# Patient Record
Sex: FEMALE | Race: WHITE | NOT HISPANIC OR LATINO | Employment: FULL TIME | URBAN - METROPOLITAN AREA
[De-identification: names, ages, dates, MRNs, and addresses within clinical notes are randomized per-mention and may not be internally consistent; named-entity substitution may affect disease eponyms.]

---

## 2017-09-21 ENCOUNTER — TRANSFERRED RECORDS (OUTPATIENT)
Dept: HEALTH INFORMATION MANAGEMENT | Facility: CLINIC | Age: 22
End: 2017-09-21

## 2017-09-21 LAB — PAP-ABSTRACT: NORMAL

## 2019-06-06 ENCOUNTER — OFFICE VISIT (OUTPATIENT)
Dept: OBGYN | Facility: CLINIC | Age: 24
End: 2019-06-06
Payer: COMMERCIAL

## 2019-06-06 VITALS
OXYGEN SATURATION: 98 % | TEMPERATURE: 98.4 F | HEART RATE: 108 BPM | BODY MASS INDEX: 31.08 KG/M2 | HEIGHT: 63 IN | DIASTOLIC BLOOD PRESSURE: 85 MMHG | SYSTOLIC BLOOD PRESSURE: 128 MMHG | WEIGHT: 175.4 LBS

## 2019-06-06 DIAGNOSIS — Z11.3 SCREEN FOR STD (SEXUALLY TRANSMITTED DISEASE): Primary | ICD-10-CM

## 2019-06-06 DIAGNOSIS — N76.0 BV (BACTERIAL VAGINOSIS): ICD-10-CM

## 2019-06-06 DIAGNOSIS — B96.89 BV (BACTERIAL VAGINOSIS): ICD-10-CM

## 2019-06-06 DIAGNOSIS — R30.0 DYSURIA: ICD-10-CM

## 2019-06-06 LAB
ALBUMIN UR-MCNC: ABNORMAL MG/DL
APPEARANCE UR: CLEAR
BILIRUB UR QL STRIP: NEGATIVE
COLOR UR AUTO: YELLOW
GLUCOSE UR STRIP-MCNC: NEGATIVE MG/DL
HGB UR QL STRIP: ABNORMAL
KETONES UR STRIP-MCNC: NEGATIVE MG/DL
LEUKOCYTE ESTERASE UR QL STRIP: NEGATIVE
NITRATE UR QL: NEGATIVE
NON-SQ EPI CELLS #/AREA URNS LPF: ABNORMAL /LPF
PH UR STRIP: 7 PH (ref 5–7)
RBC #/AREA URNS AUTO: ABNORMAL /HPF
SOURCE: ABNORMAL
SP GR UR STRIP: 1.01 (ref 1–1.03)
SPECIMEN SOURCE: ABNORMAL
UROBILINOGEN UR STRIP-ACNC: 0.2 EU/DL (ref 0.2–1)
WBC #/AREA URNS AUTO: ABNORMAL /HPF
WET PREP SPEC: ABNORMAL

## 2019-06-06 PROCEDURE — 81001 URINALYSIS AUTO W/SCOPE: CPT | Performed by: NURSE PRACTITIONER

## 2019-06-06 PROCEDURE — 87086 URINE CULTURE/COLONY COUNT: CPT | Performed by: NURSE PRACTITIONER

## 2019-06-06 PROCEDURE — 87210 SMEAR WET MOUNT SALINE/INK: CPT | Performed by: NURSE PRACTITIONER

## 2019-06-06 PROCEDURE — 87591 N.GONORRHOEAE DNA AMP PROB: CPT | Performed by: NURSE PRACTITIONER

## 2019-06-06 PROCEDURE — 99203 OFFICE O/P NEW LOW 30 MIN: CPT | Performed by: NURSE PRACTITIONER

## 2019-06-06 PROCEDURE — 87491 CHLMYD TRACH DNA AMP PROBE: CPT | Performed by: NURSE PRACTITIONER

## 2019-06-06 RX ORDER — METRONIDAZOLE 500 MG/1
500 TABLET ORAL 2 TIMES DAILY
Qty: 14 TABLET | Refills: 0 | Status: SHIPPED | OUTPATIENT
Start: 2019-06-06 | End: 2019-06-11

## 2019-06-06 ASSESSMENT — MIFFLIN-ST. JEOR: SCORE: 1514.74

## 2019-06-06 ASSESSMENT — PAIN SCALES - GENERAL: PAINLEVEL: MILD PAIN (3)

## 2019-06-06 NOTE — PROGRESS NOTES
"Subjective     Maite Gates is a 23 year old female who presents to clinic today for the following health issues:    HPI   URINARY TRACT SYMPTOMS  Onset: 2 days    Description:   Painful urination (Dysuria): YES  Blood in urine (Hematuria): no   Delay in urine (Hesitency): no     Intensity: moderate    Progression of Symptoms:  same    Accompanying Signs & Symptoms:  Fever/chills: no   Flank pain no   Nausea and vomiting: no   Any vaginal symptoms: vaginal odor and vaginal itching  Abdominal/Pelvic Pain: no     History:   History of frequent UTI's: no   History of kidney stones: no   Sexually Active: YES  Possibility of pregnancy: No    Precipitating factors:   None    Therapies Tried and outcome: Increase fluid intake    Would also like STI screening. No known exposure or specific concern. In addition to dysuria, experiencing urinary frequency, urgency, small amount with feeling of incomplete emptying and occasional cloudy urine. Denies low back pain, pelvic pain, fever, nausea.    There is no problem list on file for this patient.    Past Surgical History:   Procedure Laterality Date      SECTION       NECK SURGERY       TONSILLECTOMY & ADENOIDECTOMY         Social History     Tobacco Use     Smoking status: Never Smoker     Smokeless tobacco: Never Used   Substance Use Topics     Alcohol use: Yes     Family History   Problem Relation Age of Onset     Parkinsonism Paternal Grandfather      Alzheimer Disease Paternal Grandfather          Reviewed and updated as needed this visit by Provider         Review of Systems   ROS COMP: Constitutional, HEENT, cardiovascular, pulmonary, gi and gu systems are negative, except as otherwise noted.      Objective    /85 (BP Location: Right arm, Patient Position: Sitting, Cuff Size: Adult Regular)   Pulse 108   Temp 98.4  F (36.9  C) (Oral)   Ht 1.6 m (5' 3\")   Wt 79.6 kg (175 lb 6.4 oz)   LMP 05/15/2019 (Approximate)   SpO2 98%   BMI 31.07 kg/m    Body " mass index is 31.07 kg/m .  Physical Exam   GENERAL: healthy, alert and no distress  RESP: lungs clear to auscultation - no rales, rhonchi or wheezes  CV: regular rate and rhythm, normal S1 S2, no S3 or S4, no murmur, click or rub, no peripheral edema and peripheral pulses strong  ABDOMEN: soft, nontender, no hepatosplenomegaly, no masses and bowel sounds normal   (female): normal female external genitalia, normal urethral meatus , vaginal mucosa pink, moist, well rugated, vaginal discharge - yellow and thick and normal cervix, adnexae, and uterus without masses.  MS: no gross musculoskeletal defects noted, no edema  SKIN: no suspicious lesions or rashes  PSYCH: mentation appears normal, affect normal/bright    Diagnostic Test Results:  Labs reviewed in Epic  Results for orders placed or performed in visit on 06/06/19 (from the past 24 hour(s))   UA with Microscopic   Result Value Ref Range    Color Urine Yellow     Appearance Urine Clear     Glucose Urine Negative NEG^Negative mg/dL    Bilirubin Urine Negative NEG^Negative    Ketones Urine Negative NEG^Negative mg/dL    Specific Gravity Urine 1.015 1.003 - 1.035    pH Urine 7.0 5.0 - 7.0 pH    Protein Albumin Urine Trace (A) NEG^Negative mg/dL    Urobilinogen Urine 0.2 0.2 - 1.0 EU/dL    Nitrite Urine Negative NEG^Negative    Blood Urine Trace (A) NEG^Negative    Leukocyte Esterase Urine Negative NEG^Negative    Source Midstream Urine     WBC Urine 0 - 5 OTO5^0 - 5 /HPF    RBC Urine 2-5 (A) OTO2^O - 2 /HPF    Squamous Epithelial /LPF Urine Few FEW^Few /LPF   Wet prep   Result Value Ref Range    Specimen Description Vagina     Wet Prep No Trichomonas seen     Wet Prep Many  Clue cells seen   (A)     Wet Prep No yeast seen     Wet Prep Rare  WBC'S seen              Assessment & Plan     1. Screen for STD (sexually transmitted disease)  - Chlamydia trachomatis PCR  - Neisseria gonorrhoeae PCR  - Wet prep    2. Dysuria  Await culture and treat if indicated.  - Urine  Culture Aerobic Bacterial  - UA with Microscopic  - Wet prep    3. BV (bacterial vaginosis)  Patient aware of result. Metronidazole 500mg po bid x 7 days.  #14 with no refills.  Cautioned patient not to drink alcohol while taking this medication.  Advised patient to take with food as it may cause GI upset. No intercourse x 1 week.  - metroNIDAZOLE (FLAGYL) 500 MG tablet; Take 1 tablet (500 mg) by mouth 2 times daily for 7 days  Dispense: 14 tablet; Refill: 0     BERLIN Parisi CNP  [facility]

## 2019-06-06 NOTE — Clinical Note
Please abstract the following data from this visit with this patient into the appropriate field in Epic:Pap smear done on this date: 9/21/17 (approximately), by this group: [facility], results were NIL.

## 2019-06-07 LAB
BACTERIA SPEC CULT: NORMAL
C TRACH DNA SPEC QL NAA+PROBE: NEGATIVE
N GONORRHOEA DNA SPEC QL NAA+PROBE: NEGATIVE
SPECIMEN SOURCE: NORMAL

## 2019-06-11 ENCOUNTER — OFFICE VISIT (OUTPATIENT)
Dept: FAMILY MEDICINE | Facility: CLINIC | Age: 24
End: 2019-06-11
Payer: COMMERCIAL

## 2019-06-11 VITALS
OXYGEN SATURATION: 97 % | BODY MASS INDEX: 29.77 KG/M2 | WEIGHT: 168 LBS | HEART RATE: 87 BPM | RESPIRATION RATE: 16 BRPM | SYSTOLIC BLOOD PRESSURE: 114 MMHG | HEIGHT: 63 IN | TEMPERATURE: 98.1 F | DIASTOLIC BLOOD PRESSURE: 78 MMHG

## 2019-06-11 DIAGNOSIS — K21.9 GASTROESOPHAGEAL REFLUX DISEASE, ESOPHAGITIS PRESENCE NOT SPECIFIED: Primary | ICD-10-CM

## 2019-06-11 PROCEDURE — 99214 OFFICE O/P EST MOD 30 MIN: CPT | Performed by: PHYSICIAN ASSISTANT

## 2019-06-11 RX ORDER — OMEPRAZOLE 20 MG/1
20 TABLET, DELAYED RELEASE ORAL DAILY
Qty: 90 TABLET | Refills: 1 | Status: SHIPPED | OUTPATIENT
Start: 2019-06-11

## 2019-06-11 RX ORDER — SUCRALFATE ORAL 1 G/10ML
1000 SUSPENSION ORAL
COMMUNITY
Start: 2019-06-10 | End: 2019-06-24

## 2019-06-11 ASSESSMENT — MIFFLIN-ST. JEOR: SCORE: 1481.17

## 2019-06-11 ASSESSMENT — PAIN SCALES - GENERAL: PAINLEVEL: MODERATE PAIN (4)

## 2019-06-11 NOTE — PROGRESS NOTES
"Subjective     Maite Gates is a 23 year old female who presents to clinic today for the following health issues:    HPI   Chest pain      Duration: 5 days    Description  Chest pain/discomfort     Severity: variable     Accompanying signs and symptoms: cold symptoms last week which resolved     More intense burning, \"firy\" chest. Eating makes it worse. No radiation of pain.     History (predisposing factors):  Was seen at er yesterday due to chest pain - was told she has acid reflux and to drink pepto. Was instructed to see gastro but she had this appointment already set up so she cam here     Precipitating or alleviating factors: hasnt eaten anything for the last 36 hours     Therapies tried and outcome:  Carafate, pepto - no relief  Care Everywhere Reviewed    Tx: ibu: 800mg q 6hr over the last 5 days.     There is no problem list on file for this patient.    Past Surgical History:   Procedure Laterality Date      SECTION       NECK SURGERY       TONSILLECTOMY & ADENOIDECTOMY         Social History     Tobacco Use     Smoking status: Never Smoker     Smokeless tobacco: Never Used   Substance Use Topics     Alcohol use: Yes     Family History   Problem Relation Age of Onset     Parkinsonism Paternal Grandfather      Alzheimer Disease Paternal Grandfather          Current Outpatient Medications   Medication Sig Dispense Refill     omeprazole (PRILOSEC OTC) 20 MG EC tablet Take 1 tablet (20 mg) by mouth daily 90 tablet 1     ranitidine (ZANTAC) 150 MG tablet Take 1 tablet (150 mg) by mouth 2 times daily 60 tablet 1     sucralfate (CARAFATE) 1 GM/10ML suspension Take 1,000 mg by mouth       No Known Allergies    Reviewed and updated as needed this visit by Provider  Tobacco  Allergies  Meds  Problems  Med Hx  Surg Hx  Fam Hx         Review of Systems   ROS COMP: Constitutional, HEENT, cardiovascular, pulmonary, gi and gu systems are negative, except as otherwise noted.      Objective    /78   " "Pulse 87   Temp 98.1  F (36.7  C) (Oral)   Resp 16   Ht 1.6 m (5' 3\")   Wt 76.2 kg (168 lb)   LMP 05/15/2019 (Approximate)   SpO2 97%   BMI 29.76 kg/m    Body mass index is 29.76 kg/m .  Physical Exam   GENERAL: healthy, alert and no distress  Head: Normocephalic, atraumatic.  Eyes: Conjunctiva clear, non icteric. PERRLA.  Ears: External ears and TMs normal BL.  Nose: Septum midline, nasal mucosa pink and moist. No discharge.  Mouth / Throat: Normal dentition.  No oral lesions. Pharynx non erythematous, tonsils without hypertrophy.  Neck: Supple, no enlarged LN, trachea midline.  Heart: S1 and S2 normal, no murmurs, clicks, gallops or rubs. Regular rate and rhythm.  Chest: Clear; no wheezes or rales.  The abdomen is soft without tenderness, guarding, mass, rebound or organomegaly. Bowel sounds are normal. Mild epigastric tenderness. No rib/ chest tenderness.         Diagnostic Test Results:  Labs reviewed in Epic        Assessment & Plan       ICD-10-CM    1. Gastroesophageal reflux disease, esophagitis presence not specified K21.9 omeprazole (PRILOSEC OTC) 20 MG EC tablet     ranitidine (ZANTAC) 150 MG tablet     GASTROENTEROLOGY ADULT REF PROCEDURE ONLY   start zantac and omeprazole. warning signs discussed. side effects discussed  Will get endoscopy  Follow up  Dependant on results.   Recheck 4 wks.   See Patient Instructions   Anthony Calvert PA-C  [facility]      "

## 2019-06-11 NOTE — PATIENT INSTRUCTIONS
Patient Education     What Is GERD?     With GERD, the weak LES allows food and fluids to travel back, or reflux, into the esophagus.      If you often have a painful burning feeling in your chest after you eat, you may have gastroesophageal reflux disease (GERD). Heartburn that keeps coming back is a classic symptom of GERD. But you may have other symptoms as well. A GERD diagnosis is made only after a complete evaluation by your healthcare provider.  Note: Chest pain may also be caused by heart problems. Be sure to have all chest pain evaluated by a healthcare provider.   When you have a reflux problem  After you eat, food travels from your mouth down the esophagus to your stomach. Along the way, food passes through a one-way valve called the lower esophageal sphincter (LES). The LES sits at the opening to your stomach. Normally the LES opens when you swallow. It lets food enter the stomach, then closes quickly. With GERD, the LES doesn t work normally. It lets food and stomach acid flow back (reflux) into the esophagus.  Some common symptoms    Frequent heartburn or burping    Sour-tasting fluid backing up into your mouth    Symptoms that get worse after you eat, bend over, or lie down    Trouble swallowing or pain when swallowing    A dry, long-term (chronic) cough    Upset stomach (nausea) or vomiting  Relieving your discomfort  You and your healthcare provider can work together to find the treatment options that best ease your symptoms. These may include lifestyle changes, medicine, and possibly surgery.  Many people find their GERD symptoms decrease when they eat small frequent meals instead of 3 large ones. Reducing the amount of fatty foods in your diet will also help.   The following foods tend to cause problems for people diagnosed with GERD:    Tomatoes and tomato products    Alcohol    Coffee    Peppermint    Greasy or spicy foods  Talk with your provider if you don t understand how to make the  dietary changes needed to control your GERD symptoms. Your provider can refer you to a nutritionist.  Date Last Reviewed: 7/1/2016 2000-2018 [employer]. [address] [place] [address], [place] [address]. All rights reserved. This information is not intended as a substitute for professional medical care. Always follow your healthcare professional's instructions.

## 2019-06-24 ENCOUNTER — NURSE TRIAGE (OUTPATIENT)
Dept: NURSING | Facility: CLINIC | Age: 24
End: 2019-06-24

## 2019-06-24 ENCOUNTER — OFFICE VISIT (OUTPATIENT)
Dept: OBGYN | Facility: CLINIC | Age: 24
End: 2019-06-24
Payer: COMMERCIAL

## 2019-06-24 VITALS
HEART RATE: 90 BPM | OXYGEN SATURATION: 99 % | TEMPERATURE: 98.3 F | WEIGHT: 170.4 LBS | SYSTOLIC BLOOD PRESSURE: 123 MMHG | HEIGHT: 63 IN | DIASTOLIC BLOOD PRESSURE: 79 MMHG | BODY MASS INDEX: 30.19 KG/M2

## 2019-06-24 DIAGNOSIS — O20.0 THREATENED ABORTION: Primary | ICD-10-CM

## 2019-06-24 LAB — B-HCG SERPL-ACNC: 14 IU/L (ref 0–5)

## 2019-06-24 PROCEDURE — 99213 OFFICE O/P EST LOW 20 MIN: CPT | Performed by: NURSE PRACTITIONER

## 2019-06-24 PROCEDURE — 86900 BLOOD TYPING SEROLOGIC ABO: CPT | Performed by: NURSE PRACTITIONER

## 2019-06-24 PROCEDURE — 36415 COLL VENOUS BLD VENIPUNCTURE: CPT | Performed by: NURSE PRACTITIONER

## 2019-06-24 PROCEDURE — 84702 CHORIONIC GONADOTROPIN TEST: CPT | Performed by: NURSE PRACTITIONER

## 2019-06-24 PROCEDURE — 86901 BLOOD TYPING SEROLOGIC RH(D): CPT | Performed by: NURSE PRACTITIONER

## 2019-06-24 PROCEDURE — 86850 RBC ANTIBODY SCREEN: CPT | Performed by: NURSE PRACTITIONER

## 2019-06-24 ASSESSMENT — PAIN SCALES - GENERAL: PAINLEVEL: NO PAIN (0)

## 2019-06-24 ASSESSMENT — MIFFLIN-ST. JEOR: SCORE: 1492.06

## 2019-06-24 NOTE — PROGRESS NOTES
"Subjective     Maite Gates is a 23 year old female who presents to clinic today for the following health issues:    HPI   Possible miscarriage. Per patient, last 3 cycles have been a little shorter than normal-each about 2-3 days of bleeding. They came at the expected time, normal flow. Had that on 19. Then last week started to have brown spotting that became more red and normal flow. No cramping, no clots, no passage of tissue. No heavy bleeding. Denies dizziness. Bleeding today is minimal spotting. Took a pregnancy test as she read online that brown could be miscarriage. Per patient, test was positive almost immediately. Denies other pregnancy symptoms.  If she is pregnant and it progresses, would plan to parent-this was not planned.  If she is not pregnant or miscarries, would like to discuss possible oral contraceptive pill prescription or IUD.  Believes she is O positive blood type.    There is no problem list on file for this patient.    Past Surgical History:   Procedure Laterality Date      SECTION       NECK SURGERY       TONSILLECTOMY & ADENOIDECTOMY         Social History     Tobacco Use     Smoking status: Never Smoker     Smokeless tobacco: Never Used   Substance Use Topics     Alcohol use: Yes     Family History   Problem Relation Age of Onset     Parkinsonism Paternal Grandfather      Alzheimer Disease Paternal Grandfather          Reviewed and updated as needed this visit by Provider  Tobacco  Allergies  Meds  Problems  Med Hx  Surg Hx  Fam Hx  Soc Hx          Review of Systems   ROS COMP: Constitutional, HEENT, cardiovascular, pulmonary, gi and gu systems are negative, except as otherwise noted.      Objective    /79 (BP Location: Right arm, Patient Position: Sitting, Cuff Size: Adult Regular)   Pulse 90   Temp 98.3  F (36.8  C) (Oral)   Ht 1.6 m (5' 3\")   Wt 77.3 kg (170 lb 6.4 oz)   LMP 2019 (Exact Date)   SpO2 99%   BMI 30.19 kg/m    Body mass index is " 30.19 kg/m .  Physical Exam   GENERAL: healthy, alert and no distress  ABDOMEN: soft, nontender, no hepatosplenomegaly, no masses and bowel sounds normal  MS: no gross musculoskeletal defects noted, no edema  SKIN: no suspicious lesions or rashes  PSYCH: mentation appears normal, affect normal/bright    Assessment & Plan     1. Threatened   We discussed her cycle changes recently and positive home test and possible etiologies of her bleeding now. Check Quant HCG and if positive, plan repeat Wednesday. If continues to rise, will recheck Friday and plan ultrasound when high enough.  If negative, will notify patient and we discussed contraception briefly, if she chooses oral contraceptive pills, can send prescription for her. If she chooses IUD, discussed timeframe for insertion. Patient to plan pelvic rest at this time. Warning signs to monitor for and report immediately and also when to proceed directly to ED discussed with patient and she verbalizes understanding.  - ABO/Rh type and screen  - HCG quantitative pregnancy; Standing  - HCG quantitative pregnancy     BERLIN Parisi CNP  [facility]

## 2019-06-24 NOTE — TELEPHONE ENCOUNTER
Maiet had a positive pregnancy test yesterday. She is having vaginal bleeding that is like a period without cramps. She's not sure how far along she is as her last three periods have not been regular, nor as heavy as or lasting as long as what she usually has. Per the protocol I  instructed she be seen today or tomorrow. I told her when to call back.  She stated understanding and agreement.  I transferred her to scheduling.  Additional Information    Negative: Shock suspected (e.g., cold/pale/clammy skin, too weak to stand, low BP, rapid pulse)    Negative: Difficult to awaken or acting confused (e.g., disoriented, slurred speech)    Negative: Passed out (i.e., fainted, collapsed and was not responding)    Negative: Sounds like a life-threatening emergency to the triager    Negative: SEVERE abdominal pain (e.g., excruciating)    Negative: SEVERE vaginal bleeding (i.e., soaking 2 pads / hour, large blood clots) and present for 2 or more hours    Negative: SEVERE dizziness (e.g., unable to stand, requires support to walk, feels like passing out)    Negative: Passed tissue (e.g., gray-white)    Negative: Shoulder pain    Negative: Constant abdominal pain lasting > 1 hour    Negative: Fever > 100.4 F (38.0 C)    Negative: Pale skin (pallor) of new onset or worsening    Negative: Patient sounds very sick or weak to the triager    Negative: MODERATE vaginal bleeding (i.e., soaking 1 pad / hour; clots)    Negative: Intermittent lower abdominal pain (e.g., cramping) lasting > 24 hours    Negative: Pain or burning with passing urine (urination)    Negative: Prior history of 'ectopic pregnancy' or previous tubal surgery (e.g., tubal ligation)    Negative: Patient wants to be seen    MILD vaginal bleeding (i.e., less than 1 pad / hour; less than patient's usual menstrual bleeding; not just spotting)    Protocols used: PREGNANCY - VAGINAL BLEEDING LESS THAN 20 WEEKS PRIETOA-KEVIN ANTOINE RN [facility]

## 2019-06-25 LAB
ABO + RH BLD: NORMAL
ABO + RH BLD: NORMAL
BLD GP AB SCN SERPL QL: NORMAL
BLOOD BANK CMNT PATIENT-IMP: NORMAL
SPECIMEN EXP DATE BLD: NORMAL

## 2019-06-27 DIAGNOSIS — O20.0 THREATENED ABORTION: ICD-10-CM

## 2019-06-27 LAB — B-HCG SERPL-ACNC: 4 IU/L (ref 0–5)

## 2019-06-27 PROCEDURE — 36415 COLL VENOUS BLD VENIPUNCTURE: CPT | Performed by: NURSE PRACTITIONER

## 2019-06-27 PROCEDURE — 84702 CHORIONIC GONADOTROPIN TEST: CPT | Performed by: NURSE PRACTITIONER

## 2021-10-06 LAB
HEPATITIS B SURFACE ANTIGEN (EXTERNAL): NONREACTIVE
HIV1+2 AB SERPL QL IA: NONREACTIVE

## 2021-10-08 LAB — RUBELLA ANTIBODY IGG (EXTERNAL): NORMAL

## 2022-04-19 ENCOUNTER — HOSPITAL ENCOUNTER (OUTPATIENT)
Facility: CLINIC | Age: 27
Discharge: HOME | End: 2022-04-20
Attending: OBSTETRICS & GYNECOLOGY | Admitting: OBSTETRICS & GYNECOLOGY
Payer: COMMERCIAL

## 2022-04-19 DIAGNOSIS — O99.019 ANEMIA AFFECTING THIRD PREGNANCY: ICD-10-CM

## 2022-04-19 PROBLEM — O36.8390 NON-REASSURING ELECTRONIC FETAL MONITORING TRACING: Status: ACTIVE | Noted: 2022-04-19

## 2022-04-19 LAB
ABO/RH(D): NORMAL
ALBUMIN SERPL-MCNC: 2.8 G/DL (ref 3.4–5)
ALP SERPL-CCNC: 176 U/L (ref 40–150)
ALT SERPL W P-5'-P-CCNC: 16 U/L (ref 0–50)
ANION GAP SERPL CALCULATED.3IONS-SCNC: 11 MMOL/L (ref 3–14)
ANTIBODY SCREEN: NEGATIVE
AST SERPL W P-5'-P-CCNC: 16 U/L (ref 0–45)
BASOPHILS # BLD AUTO: 0.1 10E3/UL (ref 0–0.2)
BASOPHILS NFR BLD AUTO: 1 %
BILIRUB SERPL-MCNC: 0.4 MG/DL (ref 0.2–1.3)
BUN SERPL-MCNC: 7 MG/DL (ref 7–30)
CALCIUM SERPL-MCNC: 9.8 MG/DL (ref 8.5–10.1)
CHLORIDE BLD-SCNC: 106 MMOL/L (ref 94–109)
CO2 SERPL-SCNC: 21 MMOL/L (ref 20–32)
CREAT SERPL-MCNC: 0.7 MG/DL (ref 0.52–1.04)
EOSINOPHIL # BLD AUTO: 0.1 10E3/UL (ref 0–0.7)
EOSINOPHIL NFR BLD AUTO: 1 %
ERYTHROCYTE [DISTWIDTH] IN BLOOD BY AUTOMATED COUNT: 14.4 % (ref 10–15)
GFR SERPL CREATININE-BSD FRML MDRD: >90 ML/MIN/1.73M2
GLUCOSE BLD-MCNC: 118 MG/DL (ref 70–99)
HCT VFR BLD AUTO: 32.2 % (ref 35–47)
HGB BLD-MCNC: 10.7 G/DL (ref 11.7–15.7)
IMM GRANULOCYTES # BLD: 0.2 10E3/UL
IMM GRANULOCYTES NFR BLD: 1 %
LYMPHOCYTES # BLD AUTO: 2 10E3/UL (ref 0.8–5.3)
LYMPHOCYTES NFR BLD AUTO: 16 %
MCH RBC QN AUTO: 27.7 PG (ref 26.5–33)
MCHC RBC AUTO-ENTMCNC: 33.2 G/DL (ref 31.5–36.5)
MCV RBC AUTO: 83 FL (ref 78–100)
MONOCYTES # BLD AUTO: 0.6 10E3/UL (ref 0–1.3)
MONOCYTES NFR BLD AUTO: 5 %
NEUTROPHILS # BLD AUTO: 9.9 10E3/UL (ref 1.6–8.3)
NEUTROPHILS NFR BLD AUTO: 76 %
NRBC # BLD AUTO: 0 10E3/UL
NRBC BLD AUTO-RTO: 0 /100
PLATELET # BLD AUTO: 196 10E3/UL (ref 150–450)
POTASSIUM BLD-SCNC: 3.4 MMOL/L (ref 3.4–5.3)
PROT SERPL-MCNC: 7.1 G/DL (ref 6.8–8.8)
RBC # BLD AUTO: 3.86 10E6/UL (ref 3.8–5.2)
SARS-COV-2 RNA RESP QL NAA+PROBE: NEGATIVE
SODIUM SERPL-SCNC: 138 MMOL/L (ref 133–144)
SPECIMEN EXPIRATION DATE: NORMAL
TSH SERPL DL<=0.005 MIU/L-ACNC: 1.81 MU/L (ref 0.4–4)
WBC # BLD AUTO: 12.7 10E3/UL (ref 4–11)

## 2022-04-19 PROCEDURE — 99222 1ST HOSP IP/OBS MODERATE 55: CPT | Mod: AI | Performed by: OBSTETRICS & GYNECOLOGY

## 2022-04-19 PROCEDURE — U0003 INFECTIOUS AGENT DETECTION BY NUCLEIC ACID (DNA OR RNA); SEVERE ACUTE RESPIRATORY SYNDROME CORONAVIRUS 2 (SARS-COV-2) (CORONAVIRUS DISEASE [COVID-19]), AMPLIFIED PROBE TECHNIQUE, MAKING USE OF HIGH THROUGHPUT TECHNOLOGIES AS DESCRIBED BY CMS-2020-01-R: HCPCS | Performed by: STUDENT IN AN ORGANIZED HEALTH CARE EDUCATION/TRAINING PROGRAM

## 2022-04-19 PROCEDURE — G0463 HOSPITAL OUTPT CLINIC VISIT: HCPCS

## 2022-04-19 PROCEDURE — 96360 HYDRATION IV INFUSION INIT: CPT

## 2022-04-19 PROCEDURE — 84443 ASSAY THYROID STIM HORMONE: CPT | Performed by: STUDENT IN AN ORGANIZED HEALTH CARE EDUCATION/TRAINING PROGRAM

## 2022-04-19 PROCEDURE — 80053 COMPREHEN METABOLIC PANEL: CPT | Performed by: STUDENT IN AN ORGANIZED HEALTH CARE EDUCATION/TRAINING PROGRAM

## 2022-04-19 PROCEDURE — 85025 COMPLETE CBC W/AUTO DIFF WBC: CPT | Performed by: STUDENT IN AN ORGANIZED HEALTH CARE EDUCATION/TRAINING PROGRAM

## 2022-04-19 PROCEDURE — 96361 HYDRATE IV INFUSION ADD-ON: CPT

## 2022-04-19 PROCEDURE — 82728 ASSAY OF FERRITIN: CPT | Performed by: OBSTETRICS & GYNECOLOGY

## 2022-04-19 PROCEDURE — 36415 COLL VENOUS BLD VENIPUNCTURE: CPT | Performed by: STUDENT IN AN ORGANIZED HEALTH CARE EDUCATION/TRAINING PROGRAM

## 2022-04-19 PROCEDURE — 120N000002 HC R&B MED SURG/OB UMMC

## 2022-04-19 PROCEDURE — 86901 BLOOD TYPING SEROLOGIC RH(D): CPT | Performed by: STUDENT IN AN ORGANIZED HEALTH CARE EDUCATION/TRAINING PROGRAM

## 2022-04-19 PROCEDURE — 258N000003 HC RX IP 258 OP 636

## 2022-04-19 RX ORDER — OMEPRAZOLE 20 MG/1
20 TABLET, DELAYED RELEASE ORAL DAILY
Status: DISCONTINUED | OUTPATIENT
Start: 2022-04-19 | End: 2022-04-19 | Stop reason: CLARIF

## 2022-04-19 RX ORDER — PANTOPRAZOLE SODIUM 20 MG/1
40 TABLET, DELAYED RELEASE ORAL DAILY PRN
Status: DISCONTINUED | OUTPATIENT
Start: 2022-04-19 | End: 2022-04-20 | Stop reason: HOSPADM

## 2022-04-19 RX ORDER — VITAMIN A ACETATE, .BETA.-CAROTENE, ASCORBIC ACID, CHOLECALCIFEROL, .ALPHA.-TOCOPHEROL ACETATE, DL-, THIAMINE MONONITRATE, RIBOFLAVIN, NIACINAMIDE, PYRIDOXINE HYDROCHLORIDE, FOLIC ACID, CYANOCOBALAMIN, CALCIUM CARBONATE, FERROUS FUMARATE, ZINC OXIDE, AND CUPRIC OXIDE 2000; 2000; 120; 400; 22; 1.84; 3; 20; 10; 1; 12; 200; 27; 25; 2 [IU]/1; [IU]/1; MG/1; [IU]/1; MG/1; MG/1; MG/1; MG/1; MG/1; MG/1; UG/1; MG/1; MG/1; MG/1; MG/1
1 TABLET ORAL DAILY
COMMUNITY

## 2022-04-19 RX ORDER — METOCLOPRAMIDE 10 MG/1
10 TABLET ORAL EVERY 6 HOURS PRN
Status: DISCONTINUED | OUTPATIENT
Start: 2022-04-19 | End: 2022-04-20 | Stop reason: HOSPADM

## 2022-04-19 RX ORDER — FAMOTIDINE 20 MG/1
20 TABLET, FILM COATED ORAL DAILY PRN
Status: DISCONTINUED | OUTPATIENT
Start: 2022-04-19 | End: 2022-04-20 | Stop reason: HOSPADM

## 2022-04-19 RX ORDER — AMOXICILLIN 250 MG
1 CAPSULE ORAL 2 TIMES DAILY
Status: DISCONTINUED | OUTPATIENT
Start: 2022-04-19 | End: 2022-04-20 | Stop reason: HOSPADM

## 2022-04-19 RX ORDER — SODIUM CHLORIDE, SODIUM LACTATE, POTASSIUM CHLORIDE, CALCIUM CHLORIDE 600; 310; 30; 20 MG/100ML; MG/100ML; MG/100ML; MG/100ML
INJECTION, SOLUTION INTRAVENOUS CONTINUOUS
Status: DISCONTINUED | OUTPATIENT
Start: 2022-04-19 | End: 2022-04-20 | Stop reason: HOSPADM

## 2022-04-19 RX ORDER — SODIUM CHLORIDE, SODIUM LACTATE, POTASSIUM CHLORIDE, CALCIUM CHLORIDE 600; 310; 30; 20 MG/100ML; MG/100ML; MG/100ML; MG/100ML
INJECTION, SOLUTION INTRAVENOUS
Status: COMPLETED
Start: 2022-04-19 | End: 2022-04-19

## 2022-04-19 RX ORDER — ONDANSETRON 4 MG/1
4 TABLET, ORALLY DISINTEGRATING ORAL EVERY 6 HOURS PRN
Status: DISCONTINUED | OUTPATIENT
Start: 2022-04-19 | End: 2022-04-20 | Stop reason: HOSPADM

## 2022-04-19 RX ORDER — PRENATAL VIT/IRON FUM/FOLIC AC 27MG-0.8MG
1 TABLET ORAL DAILY
Status: DISCONTINUED | OUTPATIENT
Start: 2022-04-20 | End: 2022-04-20 | Stop reason: HOSPADM

## 2022-04-19 RX ORDER — ACETAMINOPHEN 325 MG/1
650 TABLET ORAL EVERY 4 HOURS PRN
Status: DISCONTINUED | OUTPATIENT
Start: 2022-04-19 | End: 2022-04-20 | Stop reason: HOSPADM

## 2022-04-19 RX ORDER — PROCHLORPERAZINE MALEATE 10 MG
10 TABLET ORAL EVERY 6 HOURS PRN
Status: DISCONTINUED | OUTPATIENT
Start: 2022-04-19 | End: 2022-04-20 | Stop reason: HOSPADM

## 2022-04-19 RX ORDER — ONDANSETRON 2 MG/ML
4 INJECTION INTRAMUSCULAR; INTRAVENOUS EVERY 6 HOURS PRN
Status: DISCONTINUED | OUTPATIENT
Start: 2022-04-19 | End: 2022-04-20 | Stop reason: HOSPADM

## 2022-04-19 RX ORDER — METOCLOPRAMIDE HYDROCHLORIDE 5 MG/ML
10 INJECTION INTRAMUSCULAR; INTRAVENOUS EVERY 6 HOURS PRN
Status: DISCONTINUED | OUTPATIENT
Start: 2022-04-19 | End: 2022-04-20 | Stop reason: HOSPADM

## 2022-04-19 RX ORDER — PROCHLORPERAZINE 25 MG
25 SUPPOSITORY, RECTAL RECTAL EVERY 12 HOURS PRN
Status: DISCONTINUED | OUTPATIENT
Start: 2022-04-19 | End: 2022-04-20 | Stop reason: HOSPADM

## 2022-04-19 RX ORDER — AMOXICILLIN 250 MG
2 CAPSULE ORAL 2 TIMES DAILY
Status: DISCONTINUED | OUTPATIENT
Start: 2022-04-19 | End: 2022-04-20 | Stop reason: HOSPADM

## 2022-04-19 RX ADMIN — SODIUM CHLORIDE, POTASSIUM CHLORIDE, SODIUM LACTATE AND CALCIUM CHLORIDE: 600; 310; 30; 20 INJECTION, SOLUTION INTRAVENOUS at 17:35

## 2022-04-19 NOTE — PLAN OF CARE
Data: Patient presented to [facility] at 1420.   Reason for maternal/fetal assessment per patient is extended fetal monitoring.  Patient is a . Prenatal record reviewed.      OB History    Para Term  AB Living   2 1 1 0 0 1   SAB IAB Ectopic Multiple Live Births   0 0 0 0 1      # Outcome Date GA Lbr Aman/2nd Weight Sex Delivery Anes PTL Lv   2 Current            1 Term  41w0d  4.309 kg (9 lb 8 oz) M CS-Unspec   VERONICA   . Medical history:   Past Medical History:   Diagnosis Date     NO ACTIVE PROBLEMS    . Gestational Age 36w3d. VSS. Fetal movement present. Patient denies cramping, backache, vaginal discharge, pelvic pressure, UTI symptoms, GI problems, bloody show, vaginal bleeding, edema, headache, visual disturbances, epigastric or URQ pain, abdominal pain, rupture of membranes. Support persons are present.  Action: Verbal consent for EFM. Triage assessment completed. EFM and [place] applied for fetal uterine assessment . Fetal assessment: Presumed adequate fetal oxygenation documented (see flow record).   Response: Dr. Peñaloza informed of arrival. Plan per provider is admit to inpatient for extended monitoring. Patient verbalized agreement with plan. Patient transferred to room 479 ambulatory, oriented to room and call light. Labs drawn and IV started.

## 2022-04-19 NOTE — H&P
Maternal Fetal Medicine   History and Physical    Patient's last menstrual period was 2021.  Estimated Date of Delivery: May 14, 2022   Gestational age:  36w3d   Obstetric History:              History of Present Illness      Maite Gates is a 26 year old  at 36w3d by LMP who presents today from [facility] for evaluation and monitoring of fetal SVT.     Patient reports that they were told this arrhythmia was seen on doppler 4 weeks ago. Ultrasound the following day did not show anything concerning, and patient was instructed to limit caffeine. The arrhythmia was visualized again today on US which prompted her admission.     Pregnancy has been uncomplicated, other than mild iron deficiency anemia. She has not had any elevated blood pressures, GTT passed. She has no chronic medical conditions. She has previously taken sertraline for anxiety but is not currently taking.    Today, she has a headache from not eating or drinking. Otherwise, she is feeling well.     Denies fevers, chills,recent illness or sick contacts, vision changes/spots, chest pain, shortness of breath, upper abdominal pain, nausea, vomiting, diarrhea, vaginal bleeding, leaking fluid from vagina. Active fetus.      Her pregnancy is complicated by:  1. Fetal arrhythmia    OB history notable for:   Prior c/s at 41w1d. She reports she was dilated to 5 cm, a stat was called due to failure to progress, general anesthesia required, and code blue called for infant per operative report. It was a traumatic birth experience.   OB History    Para Term  AB Living   2 1 1 0 0 1   SAB IAB Ectopic Multiple Live Births   0 0 0 0 1      # Outcome Date GA Lbr Aman/2nd Weight Sex Delivery Anes PTL Lv   2 Current            1 Term  41w0d  4.309 kg (9 lb 8 oz) M CS-Unspec   VERONICA       Prenatal Labs  Recent Labs   Lab Test 19  1418   ABO O   RH Pos   AS Neg    Rhogam not indicated     Lab Results   Component Value Date     ABO O 2019    RH Pos 2019    AS Neg 2019    CHPCRT Negative 2019    GCPCRT Negative 2019    HIV negative  G/C negative  Rubella immune    GBS Status:   No results found for: GBS    Normal pap .     Genetic screening: not done          Past Medical History     Past Medical History:   Diagnosis Date     NO ACTIVE PROBLEMS           Past Surgical History     Past Surgical History:   Procedure Laterality Date      SECTION       NECK SURGERY       TONSILLECTOMY & ADENOIDECTOMY            Medications     Prior to Admission Medications   Prescriptions Last Dose Informant Patient Reported? Taking?   Prenatal Vit-Fe Fumarate-FA (PNV PRENATAL PLUS MULTIVITAMIN) 27-1 MG TABS per tablet 2022 at 0800  Yes Yes   Sig: Take 1 tablet by mouth daily   omeprazole (PRILOSEC OTC) 20 MG EC tablet Unknown at Unknown time  No Yes   Sig: Take 1 tablet (20 mg) by mouth daily   ranitidine (ZANTAC) 150 MG tablet Unknown at Unknown time  No Yes   Sig: Take 1 tablet (150 mg) by mouth 2 times daily      Facility-Administered Medications: None     No Known Allergies         Social History     Social History     Tobacco Use     Smoking status: Never Smoker     Smokeless tobacco: Never Used   Denies alcohol or non-prescription drug use in pregnancy         Review of Systems    Non-contributory beyond HPI          Physical Exam     Vitals:    22 1444   BP: 125/79     Gen: Pleasant, gravid woman.  Sitting up in bed, in NAD.  Heart:: regular rate and rhythm and regular rate and rhythm without murmur, gallop or rub    Lungs: CTA B/L, no wheezing or crackles.  Abdomen: Non-tender, Gravid  Back: no lesions, no CVA tenderness  Extremities: trace edema LE bilaterally, NT        Contraction frequency: 0 in 10 minutes  FHR:  Rate 130's, moderate variability, + accelerations, no decelerations. Normal sinus rhythm           Category: 1         Laboratory/Imaging     Component      Latest Ref Rng & Units  2022   WBC      4.0 - 11.0 10e3/uL 12.7 (H)   RBC Count      3.80 - 5.20 10e6/uL 3.86   Hemoglobin      11.7 - 15.7 g/dL 10.7 (L)   Hematocrit      35.0 - 47.0 % 32.2 (L)   MCV      78 - 100 fL 83   MCH      26.5 - 33.0 pg 27.7   MCHC      31.5 - 36.5 g/dL 33.2   RDW      10.0 - 15.0 % 14.4   Platelet Count      150 - 450 10e3/uL 196   % Neutrophils      % 76   % Lymphocytes      % 16   % Monocytes      % 5   % Eosinophils      % 1   % Basophils      % 1   % Immature Granulocytes      % 1   NRBCs per 100 WBC      <1 /100 0   Absolute Neutrophils      1.6 - 8.3 10e3/uL 9.9 (H)   Absolute Lymphocytes      0.8 - 5.3 10e3/uL 2.0   Absolute Monocytes      0.0 - 1.3 10e3/uL 0.6   Absolute Eosinophils      0.0 - 0.7 10e3/uL 0.1   Absolute Basophils      0.0 - 0.2 10e3/uL 0.1   Absolute Immature Granulocytes      <=0.4 10e3/uL 0.2   Absolute NRBCs      10e3/uL 0.0   Sodium      133 - 144 mmol/L 138   Potassium      3.4 - 5.3 mmol/L 3.4   Chloride      94 - 109 mmol/L 106     US 22  EFW 3,585 g    96%        Hadlock    EFW (lb) 7 lb  EFW (oz) 14 oz    Other Structures   bpm    Impression  =========  1) Herbert intrauterine pregnancy at 36w 3d gestational age.  2) None of the anomalies commonly detected by ultrasound were evident in the limited fetal anatomic survey as described above.  3) Growth parameters and estimated fetal weight were consistent with established dates.  4) The amniotic fluid volume appeared normal.  5) Normal fetal activity for gestational age.  6) A fetal arrhythmia is noted with frequent premature atrial contractions which occasionally cause runs of supraventricular tachycardia.  7) No sonographic evidence of hydrops.           Assessment and Plan   Maite Gates is a 26 year old  at 36w3d by LMP, who presents with fetal intermittent supraventricular tachycardia. She is admitted for extended monitoring to determine persistence of arrhythmia and need for cardioversion. Fetal  heart tracing since admission reactive and reassuring with normal sinus rhythm. No hydrops on prior ultrasound.     Fetal Intermittent SVT   -Continuous monitoring for frequency of fetal arrhythmia  -Complete US tomorrow    - Plan fluid bolus and assessment of labs (CMP, CBC, T&S, TSH)    Fetal well being  - Category 1 fetal heart tracing.  - EFW at 36w3d US: 3585 g (96%ile), large for gestational age.  - Continuous EFM    Prenatal care  - Rh +, Antibody negative.  - Rubella immune, Hep B SAg NR, RPR neg, HIV NR, GC/CT negative.  - GCT passed  - GBS unknown  - Flu and Tdap not known    Method of delivery  - Fetal presentation: cephalic. Placenta: posterior  - Plan repeat CS   - Discussed indications for  delivery ( labor, non-reassuring fetal status, persistent SVT).  - Will plan to consent for repeat CS if indicated.  - Type and screen pending today    Phuong Ryan, MS3   [facility]    Physician Attestation   I, Orville Peñaloza, was present with the medical/RUDI student who participated in the service and in the documentation of the note.  I have verified the history and personally performed the physical exam and medical decision making.  I agree with the assessment and plan of care as documented in the note.      I personally reviewed vital signs, medications, labs, imaging and EFM.    In summary, Maite Gates is a  at 36w3d admitted due to fetal SVT noted on US in MFM clinic.  The SVT was self limiting and on US today and on EFM today, the primary rhythm appears to be NSR with occasional premature atrial contractions.  Will plan CEFM overnight to determine frequency of SVT to inform next steps regarding need to medical cardioversion.  Will plan repeat US in AM as well to evaluate for any fetal hydrops (not seen on US today).  Plan reviewed with the couple in detail and they are in agreement with the plan as documented above.      Orville Peñaloza MD  Date of Service  (when I saw the patient): 22    Time Spent on this Encounter   I spent 60 minutes on the unit/floor managing the care of Maite Gates. Over 50% of my time was spent on the following:   - Counseling the patient and/or family regarding: diagnostic results, prognosis, risks and benefits of treatment options and recommended follow-up  - Coordination of care with the: nurse and patient    In summary, Maite Gates is a  at 36w3d admitted due to fetal SVT noted on US in MFM clinic.  The SVT was self limiting and on US today and on EFM today, the primary rhythm appears to be NSR with occasional premature atrial contractions.  Will plan CEFM overnight to determine frequency of SVT to inform next steps regarding need to medical cardioversion.  Will plan repeat US in AM as well to evaluate for any fetal hydrops (not seen on US today).  Plan reviewed with the couple in detail and they are in agreement with the plan as documented above.      Orville Peñaloza MD       Based on Tinetti score, Pt at high risk of falling

## 2022-04-20 ENCOUNTER — HOSPITAL ENCOUNTER (INPATIENT)
Facility: CLINIC | Age: 27
Setting detail: SURGERY ADMIT
End: 2022-04-20
Attending: OBSTETRICS & GYNECOLOGY | Admitting: OBSTETRICS & GYNECOLOGY
Payer: COMMERCIAL

## 2022-04-20 ENCOUNTER — PRE VISIT (OUTPATIENT)
Dept: MATERNAL FETAL MEDICINE | Facility: CLINIC | Age: 27
End: 2022-04-20

## 2022-04-20 ENCOUNTER — APPOINTMENT (OUTPATIENT)
Dept: ULTRASOUND IMAGING | Facility: CLINIC | Age: 27
End: 2022-04-20
Payer: COMMERCIAL

## 2022-04-20 ENCOUNTER — TELEPHONE (OUTPATIENT)
Dept: MATERNAL FETAL MEDICINE | Facility: CLINIC | Age: 27
End: 2022-04-20

## 2022-04-20 VITALS
HEART RATE: 98 BPM | RESPIRATION RATE: 16 BRPM | SYSTOLIC BLOOD PRESSURE: 118 MMHG | TEMPERATURE: 98.6 F | DIASTOLIC BLOOD PRESSURE: 72 MMHG | OXYGEN SATURATION: 98 %

## 2022-04-20 PROBLEM — I49.3 PVC (PREMATURE VENTRICULAR CONTRACTION): Status: ACTIVE | Noted: 2017-08-07

## 2022-04-20 PROBLEM — O36.8390: Status: ACTIVE | Noted: 2022-04-20

## 2022-04-20 PROCEDURE — 76816 OB US FOLLOW-UP PER FETUS: CPT

## 2022-04-20 PROCEDURE — 76819 FETAL BIOPHYS PROFIL W/O NST: CPT | Mod: 26 | Performed by: OBSTETRICS & GYNECOLOGY

## 2022-04-20 PROCEDURE — 258N000003 HC RX IP 258 OP 636: Performed by: OBSTETRICS & GYNECOLOGY

## 2022-04-20 PROCEDURE — 76816 OB US FOLLOW-UP PER FETUS: CPT | Mod: 26 | Performed by: OBSTETRICS & GYNECOLOGY

## 2022-04-20 PROCEDURE — 250N000013 HC RX MED GY IP 250 OP 250 PS 637: Performed by: STUDENT IN AN ORGANIZED HEALTH CARE EDUCATION/TRAINING PROGRAM

## 2022-04-20 PROCEDURE — 96361 HYDRATE IV INFUSION ADD-ON: CPT

## 2022-04-20 PROCEDURE — 99239 HOSP IP/OBS DSCHRG MGMT >30: CPT | Mod: 25 | Performed by: OBSTETRICS & GYNECOLOGY

## 2022-04-20 RX ADMIN — SODIUM CHLORIDE, POTASSIUM CHLORIDE, SODIUM LACTATE AND CALCIUM CHLORIDE: 600; 310; 30; 20 INJECTION, SOLUTION INTRAVENOUS at 00:15

## 2022-04-20 RX ADMIN — SODIUM CHLORIDE, POTASSIUM CHLORIDE, SODIUM LACTATE AND CALCIUM CHLORIDE: 600; 310; 30; 20 INJECTION, SOLUTION INTRAVENOUS at 08:08

## 2022-04-20 RX ADMIN — PRENATAL VITAMINS-IRON FUMARATE 27 MG IRON-FOLIC ACID 0.8 MG TABLET 1 TABLET: at 08:10

## 2022-04-20 NOTE — DOWNTIME EVENT NOTE
The EMR was down for 20 minutes on 4/19/2022.      The following information was re-entered into the system by Linda Arndt RN: fetal heart rate assessment and uterine activity.     The following information will remain in the paper chart: hard copy of FHR tracing collected and sent to medical records.     Linda Arndt RN  4/20/2022

## 2022-04-20 NOTE — DISCHARGE INSTRUCTIONS
Discharge Instruction for Undelivered Patients      You were seen for:  Extended fetal monitoring  We Consulted: Dr. Peñaloza  You had (Test or Medicine): monitoring, labs and an ultrasound     Diet:   Drink 8 to 12 glasses of liquids (milk, juice, water) every day.  You may eat meals and snacks.     Activity:  Rest the pelvic area. No sex. Do not stimulate breasts or nipples.  Count fetal kicks everyday (see handout)     Call your provider if you notice:  Swelling in your face or increased swelling in your hands or legs.  Headaches that are not relieved by Tylenol (acetaminophen).  Changes in your vision (blurring: seeing spots or stars.)  Nausea (sick to your stomach) and vomiting (throwing up).   Weight gain of 5 pounds or more per week.  Heartburn that doesn't go away.  Signs of bladder infection: pain when you urinate (use the toilet), need to go more often and more urgently.  The bag of ness (rupture of membranes) breaks, or you notice leaking in your underwear.  Bright red blood in your underwear.  Abdominal (lower belly) or stomach pain.  Second (plus) baby: Contractions (tightening) less than 10 minutes apart and getting stronger.  Increase or change in vaginal discharge (note the color and amount)      Follow-up:  As scheduled in the clinic

## 2022-04-20 NOTE — DISCHARGE SUMMARY
[facility] Discharge Summary    Maite Gates MRN# [id number]   Age: 26 year old YOB: 1995     Date of Admission:  2022  Date of Discharge:  2022  Admitting Physician:  Orville Peñaloza MD  Discharge Physician:  Orville Peñaloza MD     Admission Diagnosis:   Fetal Intermittent SVT     Discharge Diagnosis:  Fetal Intermittent SVT     Procedures:  None     Consultations:  None     Medications prior to admission:  The patient was not taking any medications prior to admission      Brief History of Presentation:    Maite Gates is a 26 year old  at 36w3d by LMP who presents today from [facility] for evaluation and monitoring of fetal SVT.      Patient reports that they were told this arrhythmia was seen on doppler 4 weeks ago. Ultrasound the following day did not show anything concerning, and patient was instructed to limit caffeine. The arrhythmia was visualized again today on US which prompted her admission.      Pregnancy has been uncomplicated, other than mild iron deficiency anemia. She has not had any elevated blood pressures, GTT passed. She has no chronic medical conditions. She has previously taken sertraline for anxiety but is not currently taking.    For complete history, please see H&P from Dr. Orville Peñaloza on 22.     Hospital Course:      Maite Gates is a 26 year old  at 36w4d who presented with fetal intermittent supraventricular tachycardia. She was admitted for extended monitoring to determine persistence of arrhythmia and need for cardioversion. Fetal heart tracing over 24 hours showed PACs and nonsustained SVT for a total of 11 min. Otherwise, normal sinus rhythm. A comprehensive ultrasound did not show any evidence of hydrops. At this time, burden of SVT and PACs is not significant enough to warrant cardioversion and treatment with supra-therapeutic digoxin. After 37 weeks, delivery is indicated rather than  cardioversion should the duration of SVT increase substantially. A plan was made for 3x weekly monitoring with Wednesday appointments at [place]. Patient was counseled that PACs and SVT may resolve with delivery and detachment from placenta. Some babies may need additional treatment with an ablation is arrhythmia persists. Patient and partner aligned on plan.     Discharge Instructions:  3x weekly MFM with fetal monitoring and limited ultrasound  Consider readmission and possible delivery if SVT becomes sustained or increased frequency of SVT or fetal distress   Consider moving up date of scheduled  to ~38 weeks     Call or present to labor and delivery if you experience:   -Regular painful contractions concerning for labor   -Leakage of fluid concerning for ruptured membranes   -Decreased fetal movement   -Bright red vaginal bleeding    -Headache, vision changes, upper abdominal pain, significant increase in swelling,   generalized unwell feeling    Follow up:  Follow up in MFM clinic on 22.     Discharge Medications:  No medications were prescribed on discharge    Phuong Ryan, MS3  [facility]    Physician Attestation   I, Orville Peñaloza, saw and evaluated this patient prior to discharge.  I discussed the patient with the resident/fellow and agree with plan of care as documented in the note.      I personally reviewed vital signs, medications, labs, imaging and EFM.    I personally spent 45 minutes on discharge activities.    Orville Peñaloza MD  Date of Service (when I saw the patient): 2022

## 2022-04-20 NOTE — UTILIZATION REVIEW
"Admission Status; Secondary Review Determination     Under the authority of the Utilization Management Committee, the utilization review process indicated a secondary review on the above patient.  The review outcome is based on review of the medical records, discussions with staff, and applying clinical experience noted on the date of the review.       (x) Observation Status Appropriate - This patient does not meet hospital inpatient criteria and is placed in observation status. If this patient's primary payer is Medicare and was admitted as an inpatient, Condition Code 44 should be used and patient status changed to \"observation\".     RATIONALE FOR DETERMINATION:  26-year-old -0-0-1 at 36 weeks 3 days who has history of SVT arrhythmia and has been monitoring as an outpatient.  On day of admission patient had arrhythmia while receiving ultrasound.  Observation care appropriate to monitor frequency and duration of arrhythmia to determine if medication regimen, cardioversion or earlier delivery is appropriate.  If patient is found to have worrisome amount of arrhythmias noted, requiring a prolonged hospital stay, then at that time patient would be appropriate to advance inpatient care.    The severity of illness, intensity of service provided, expected LOS and risk for adverse outcome make the care appropriate for further observation; however, doesn't meet criteria for hospital inpatient admission. This was discussed with attending physician who concurred with this determination.    The information on this document is developed by the utilization review team in order for the business office to ensure compliance.  This only denotes the appropriateness of proper admission status and does not reflect the quality of care rendered.         The definitions of Inpatient Status and Observation Status used in making the determination above are those provided in the CMS Coverage Manual, Chapter 1 and Chapter 6, section " 70.4.      Sincerely,     Shane De Jesus MD    Physician Advisor  Utilization Review/ Case Management  [facility].

## 2022-04-20 NOTE — PROGRESS NOTES
Maternal-Fetal Medicine Progress Note    Maite Gates MRN# [id number]   Age: 26 year old  Gestational age: 36w4d   YOB: 1995       Date of Admission: 2022          Subjective:   In summary, Maite Gates is a  36w4d admitted for fetal monitoring during to fetal SVT noted on US yesterday.  Baby has been active.  She denies any contraction or LOF.  Denies vaginal bleeding. Noted fetal tachycardia on monitor overnight.           Objective:        Patient Vitals for the past 24 hrs:   BP Temp Temp src Pulse Resp SpO2   22 1112 96/59 98.3  F (36.8  C) Oral 90 16 --   22 0807 115/75 98.4  F (36.9  C) Oral 91 16 --   22 0300 108/66 97.5  F (36.4  C) Oral -- 16 98 %   22 0008 113/72 98.3  F (36.8  C) Oral -- 16 --   22 111/73 98.1  F (36.7  C) Oral -- 16 --   22 1444 125/79 -- -- -- -- --          LABS         Results for orders placed or performed during the hospital encounter of 22 (from the past 24 hour(s))   Asymptomatic COVID-19 Virus (Coronavirus) by PCR Nose    Specimen: Nose; Swab   Result Value Ref Range    SARS CoV2 PCR Negative Negative    Narrative    Testing was performed using the deidra  SARS-CoV-2 & Influenza A/B Assay on the deidra  Kinza  System.  This test should be ordered for the detection of SARS-COV-2 in individuals who meet SARS-CoV-2 clinical and/or epidemiological criteria. Test performance is unknown in asymptomatic patients.  This test is for in vitro diagnostic use under the FDA EUA for laboratories certified under CLIA to perform moderate and/or high complexity testing. This test has not been FDA cleared or approved.  A negative test does not rule out the presence of PCR inhibitors in the specimen or target RNA in concentration below the limit of detection for the assay. The possibility of a false negative should be considered if the patient's recent exposure or clinical presentation suggests COVID-19.  [facility]  Laboratories are certified under the Clinical Laboratory Improvement Amendments of 1988 (CLIA-88) as qualified to perform moderate and/or high complexity laboratory testing.   CBC with platelets differential    Narrative    The following orders were created for panel order CBC with platelets differential.  Procedure                               Abnormality         Status                     ---------                               -----------         ------                     CBC with platelets and d...[[id number]]  Abnormal            Final result                 Please view results for these tests on the individual orders.   ABO/Rh type and screen    Narrative    The following orders were created for panel order ABO/Rh type and screen.  Procedure                               Abnormality         Status                     ---------                               -----------         ------                     Adult Type and Screen[[id number]]                            Final result                 Please view results for these tests on the individual orders.   TSH with free T4 reflex   Result Value Ref Range    TSH 1.81 0.40 - 4.00 mU/L   Comprehensive metabolic panel   Result Value Ref Range    Sodium 138 133 - 144 mmol/L    Potassium 3.4 3.4 - 5.3 mmol/L    Chloride 106 94 - 109 mmol/L    Carbon Dioxide (CO2) 21 20 - 32 mmol/L    Anion Gap 11 3 - 14 mmol/L    Urea Nitrogen 7 7 - 30 mg/dL    Creatinine 0.70 0.52 - 1.04 mg/dL    Calcium 9.8 8.5 - 10.1 mg/dL    Glucose 118 (H) 70 - 99 mg/dL    Alkaline Phosphatase 176 (H) 40 - 150 U/L    AST 16 0 - 45 U/L    ALT 16 0 - 50 U/L    Protein Total 7.1 6.8 - 8.8 g/dL    Albumin 2.8 (L) 3.4 - 5.0 g/dL    Bilirubin Total 0.4 0.2 - 1.3 mg/dL    GFR Estimate >90 >60 mL/min/1.73m2   CBC with platelets and differential   Result Value Ref Range    WBC Count 12.7 (H) 4.0 - 11.0 10e3/uL    RBC Count 3.86 3.80 - 5.20 10e6/uL    Hemoglobin 10.7 (L) 11.7 - 15.7 g/dL    Hematocrit 32.2  (L) 35.0 - 47.0 %    MCV 83 78 - 100 fL    MCH 27.7 26.5 - 33.0 pg    MCHC 33.2 31.5 - 36.5 g/dL    RDW 14.4 10.0 - 15.0 %    Platelet Count 196 150 - 450 10e3/uL    % Neutrophils 76 %    % Lymphocytes 16 %    % Monocytes 5 %    % Eosinophils 1 %    % Basophils 1 %    % Immature Granulocytes 1 %    NRBCs per 100 WBC 0 <1 /100    Absolute Neutrophils 9.9 (H) 1.6 - 8.3 10e3/uL    Absolute Lymphocytes 2.0 0.8 - 5.3 10e3/uL    Absolute Monocytes 0.6 0.0 - 1.3 10e3/uL    Absolute Eosinophils 0.1 0.0 - 0.7 10e3/uL    Absolute Basophils 0.1 0.0 - 0.2 10e3/uL    Absolute Immature Granulocytes 0.2 <=0.4 10e3/uL    Absolute NRBCs 0.0 10e3/uL   Adult Type and Screen   Result Value Ref Range    ABO/RH(D) O POS     Antibody Screen Negative Negative    SPECIMEN EXPIRATION DATE [id number]    Maternal Fetal US Comprehensive Single F/U    Narrative            Comp Follow Up  ---------------------------------------------------------------------------------------------------------  Pat. Name: LUCI ISLAS       Study Date:  2022 8:13am  Pat. NO:  [id number]        Referring  MD: KODI SANDY  Site:  [facility]       Sonographer: Sarah Ferrera RDMS   :  1995        Age:   26  ---------------------------------------------------------------------------------------------------------    INDICATION  ---------------------------------------------------------------------------------------------------------  Fetal SVT      METHOD  ---------------------------------------------------------------------------------------------------------  [facility] ANTEPARTUM inpatient exam. Transabdominal ultrasound examination. View: Sufficient      PREGNANCY  ---------------------------------------------------------------------------------------------------------  Herbert pregnancy. Number of fetuses: 1      DATING  ---------------------------------------------------------------------------------------------------------                                            Date                                Details                                                                                      Gest. age                      CARLEEN  LMP                                  2021                                                                                                                           36 w + 4 d                     2022  Assigned dating                  Dating performed on 2022, based on the LMP                                                            36 w + 4 d                     2022      GENERAL EVALUATION  ---------------------------------------------------------------------------------------------------------  Cardiac activity present.  bpm.  Fetal movements present.  Presentation cephalic.  Placenta Posterior.  Umbilical cord previously studied.  Amniotic fluid Amount of AF: normal. MVP 9.0 cm. TOSHIA 20.9 cm. Q1 9.8 cm, Q2 6.7 cm, Q3 2.9 cm, Q4 1.6 cm.      FETAL ANATOMY  ---------------------------------------------------------------------------------------------------------  No evidence of fetal hydrops seen on today's exam.      BIOPHYSICAL PROFILE  ---------------------------------------------------------------------------------------------------------  2: Fetal breathing movements  2: Gross body movements  2: Fetal tone  2: Amniotic fluid volume  8/8 Biophysical profile score      RECOMMENDATION  ---------------------------------------------------------------------------------------------------------  We discussed the findings on today's ultrasound with the patient.    Continue  surveillance with thrice weekly US to evaluate for hydrops and NST to monitor for fetal arrhythmia. Given fetal SVT noted on monitoring, delivery at  [facility] is recommended. We can assume Maite's prenatal care as well due to need for 3x/week assessment at [facility]. I reviewed this with [facility] RN Triage  line, and they state they will pass  the message along to Dr. Griffiths.    Thank-you for the opportunity to participate in the care of this patient. If you have questions regarding today's evaluation or if we can be of further service, please contact the  Maternal-Fetal Medicine Center.    **Fetal anomalies may be present but not detected**        Impression    IMPRESSION  ---------------------------------------------------------------------------------------------------------  1) Herbert intrauterine pregnancy at 36w 4d gestational age.  2) There is no evidence of fetal hydrops.  3) Persistent normal sinus rhythm was noted on today's US.  4) The amniotic fluid volume appeared normal.  5) The BPP is reassuring.                Gen: Pt AAOx3, NAD  Abdomen: Gravid, Soft, Non-tender            Fetal Heart Rate Tracin's, moderate variability, + accels, no decels, SVT noted from 0136 to 0147 early this AM.              Tocometer: Quiet           Assessment/Plan:        26 year old  at 36w4d admitted for extended monitoring in the setting of fetal SVT.  Fetal heart tracing over 24 hours showed PACs and nonsustained SVT for a total of 11 min. Otherwise, normal sinus rhythm. A comprehensive ultrasound did not show any evidence of hydrops. At this time, burden of SVT and PACs is not significant enough to warrant treatment via maternal Digoxin. After 37 weeks, delivery is indicated rather than cardioversion should the duration of SVT increase substantially. A plan was made for 3x weekly monitoring with Wednesday appointments at [facility] in the event pediatric cardiology is needed for consultation.  Monday and Friday monitoring to be done at [place].  Discussed recommendation transfer of care to [facility] and delivery at [facility]; she is planning for repeat  section; we will reschedule this for [facility].    Dispo:  Continue monitoring throughout day, anticipate PM discharge     Geetha Durbin MD   Maternal-Fetal Medicine  Fellow, PGY5  2022 3:48 PM      Physician Attestation   I, Orville Peñaloza MD, saw this patient with the resident and agree with the resident/fellow's findings and plan of care as documented in the note.      I personally reviewed vital signs, medications, labs, imaging and EFM.    Key findings: In summary, Maite Gates is a  at 36w4d admitted with US finding of fetal SVT on US yesterday in context of frequent PAC's.  Only 11 minutes of SVT captured on monitoring since yesterday, indicating that treatment is not indicated and continued expectant management is recommended.  Will assess for SVT 3 times per week at this time with planned COR[facility] to [facility] for delivery here due to need for Peds Cardiology evaluation after birth.      Orville Peñaloza MD  Date of Service (when I saw the patient): 22    Time Spent on this Encounter   I spent 45 minutes on the unit/floor managing the care of Maite Gates. Over 50% of my time was spent on the following:   - Counseling the patient and/or family regarding: diagnostic results, prognosis, risks and benefits of treatment options, recommended follow-up and medical compliance  - Coordination of care with the: nurse and patient    In summary, Maite Gates is a  at 36w4d admitted with US finding of fetal SVT on US yesterday in context of frequent PAC's.  Only 11 minutes of SVT captured on monitoring since yesterday, indicating that treatment is not indicated and continued expectant management is recommended.  Will assess for SVT 3 times per week at this time with planned CORRY to [facility] for delivery here due to need for Peds Cardiology evaluation after birth.      Orville Peñaloza MD

## 2022-04-20 NOTE — PROVIDER NOTIFICATION
04/20/22 1430   Provider Notification   Provider Name/Title Dr. Peñaloza   Method of Notification At Bedside   Notification Reason Other (Comment)     RN entered room to discharge pt home. Audible arrhythmia (skipped beats) heard at bedside while on monitors. Dr. Peñaloza in department and notified. Provider at bedside to assess. Mostly skipped beats, but pt reports hearing increased HR the last few minutes as well. Plan to keep on monitors at this time.

## 2022-04-20 NOTE — PROVIDER NOTIFICATION
04/19/22 4243   Provider Notification   Provider Name/Title Dr. Guaman   Method of Notification Electronic Page   Request Evaluate - Remote   Notification Reason Other (Comment)  (audible arrhythmia)     Audible PACs and short periods of SVT for the past 25 minutes. Will continue to monitor duration of arrhythmia. Dr. Guaman notified about any change in plan of care. Awaiting any new orders.

## 2022-04-20 NOTE — PLAN OF CARE
Goal Outcome Evaluation:    Patient able to rest overnight between cares. Significant other returned home overnight. VSS. Afebrile. IV fluids infusing at 125. Patient eating and drinking. Periods of PACs and SVT audible throughout shift, with majority at normal sinus rhythm. Patient andree irregularly, reports stretches of 20-30 minutes with more intense contractions. Denies any fluid leaking or bleeding. Plan for repeat ultrasound this morning to determine further plan of care.

## 2022-04-20 NOTE — TELEPHONE ENCOUNTER
Received order from Dr. Peñaloza to initiate 3x/wk NST/RL2 (hydrops check)/BPP d/t fetal SVT. Pt prefers  location, scheduled for 4/25, 4/29 there, will complete remainder of appts at [facility]. CORRY OB 4/27 to deliver by rpt c/s at 39 wks at [facility]. Dr Peñaloza notified of plan.

## 2022-04-20 NOTE — DISCHARGE SUMMARY
Patient was [facility]. Reason for maternal/fetal admission was extended monitoring for possible fetal arrythmias.      VSS. Pt had extended fetal monitoring, occasional fetal arhythmias audible at bedside during admission. Plan to discharge home per . Close follow-up in clinic. See flowsheets for EFM and [facility] monitering. Patient instructed to report change in fetal movement, vaginal leaking of fluid or bleeding, abdominal pain, or any concerns related to the pregnancy to her nurse/physician. Patient verbalized understanding of education and verbalized agreement with plan. Discharge instructions with fetal kick count. Derick LUTHER, present at bedside. Discharged ambulatory at 1630.

## 2022-04-22 ENCOUNTER — OFFICE VISIT (OUTPATIENT)
Dept: MATERNAL FETAL MEDICINE | Facility: CLINIC | Age: 27
End: 2022-04-22
Attending: OBSTETRICS & GYNECOLOGY
Payer: COMMERCIAL

## 2022-04-22 ENCOUNTER — PREP FOR PROCEDURE (OUTPATIENT)
Dept: MATERNAL FETAL MEDICINE | Facility: CLINIC | Age: 27
End: 2022-04-22

## 2022-04-22 ENCOUNTER — HOSPITAL ENCOUNTER (OUTPATIENT)
Dept: ULTRASOUND IMAGING | Facility: CLINIC | Age: 27
Discharge: HOME | End: 2022-04-22
Attending: OBSTETRICS & GYNECOLOGY
Payer: COMMERCIAL

## 2022-04-22 ENCOUNTER — TELEPHONE (OUTPATIENT)
Dept: MATERNAL FETAL MEDICINE | Facility: CLINIC | Age: 27
End: 2022-04-22

## 2022-04-22 DIAGNOSIS — Z11.59 ENCOUNTER FOR SCREENING FOR OTHER VIRAL DISEASES: Primary | ICD-10-CM

## 2022-04-22 DIAGNOSIS — O99.019 ANEMIA AFFECTING THIRD PREGNANCY: ICD-10-CM

## 2022-04-22 DIAGNOSIS — O40.3XX0 POLYHYDRAMNIOS AFFECTING PREGNANCY IN THIRD TRIMESTER: ICD-10-CM

## 2022-04-22 DIAGNOSIS — Z98.891 HISTORY OF CESAREAN SECTION: ICD-10-CM

## 2022-04-22 LAB — FERRITIN SERPL-MCNC: 4 NG/ML (ref 12–150)

## 2022-04-22 PROCEDURE — 76816 OB US FOLLOW-UP PER FETUS: CPT

## 2022-04-22 PROCEDURE — 76818 FETAL BIOPHYS PROFILE W/NST: CPT | Mod: 26 | Performed by: OBSTETRICS & GYNECOLOGY

## 2022-04-22 PROCEDURE — 76816 OB US FOLLOW-UP PER FETUS: CPT | Mod: 26 | Performed by: OBSTETRICS & GYNECOLOGY

## 2022-04-22 NOTE — PROGRESS NOTES
The patient was seen for an ultrasound in the Maternal-Fetal Medicine Center at the [facility] today.  For a detailed report of the ultrasound examination, please see the ultrasound report which can be found under the imaging tab.    Vijaya Booker MD  , OB/GYN  Maternal-Fetal Medicine  [phone] (Pager)

## 2022-04-22 NOTE — TELEPHONE ENCOUNTER
Called pt with ferritin results. No answer, left  stating Dr. Booker supports recommendation made by [facility] to pt to give IV iron infusion. They had offered pt one time infusion dosing, Venofer through [facility] would be in 3 dose series. Informed pt that she can pursue one dose through [facility] or we could schedule 3x infusion through [facility] prior to delivery, requested callback to PCC# w/ pts decision.

## 2022-04-22 NOTE — NURSING NOTE
NST Performed due to hx of fetal SVT.  Dr. Booker reviewed efm tracing. See NST/BPP Doc Flowsheet tab. Pt also with questions about scheduling repeat c/s, recommendation to receive IV iron infusion d/t hgb of 10. Seen by Dr. Booker. Ferritin added on to previously drawn sample. Will await results before making recommendation for IV infusion. Pt scheduled for NST/hydrops/BPP on Monday at [facility].

## 2022-04-25 ENCOUNTER — TELEPHONE (OUTPATIENT)
Dept: MATERNAL FETAL MEDICINE | Facility: CLINIC | Age: 27
End: 2022-04-25
Payer: COMMERCIAL

## 2022-04-25 NOTE — TELEPHONE ENCOUNTER
Called pt to f/u on message left over weekend/visit this AM at [facility] [place]. Dr. Shah prescribed pt medication for anxiety. Fetus w/ PACs on us but no SVT today, continue with plan as scheduled per Dr. Shah recommendation. Pt did not answer, left VM with request to call back as well as rpt notification that c/s has been scheduled for 5/9.

## 2022-04-27 ENCOUNTER — OFFICE VISIT (OUTPATIENT)
Dept: MATERNAL FETAL MEDICINE | Facility: CLINIC | Age: 27
End: 2022-04-27
Attending: OBSTETRICS & GYNECOLOGY
Payer: COMMERCIAL

## 2022-04-27 ENCOUNTER — HOSPITAL ENCOUNTER (OUTPATIENT)
Dept: ULTRASOUND IMAGING | Facility: CLINIC | Age: 27
Discharge: HOME | End: 2022-04-27
Attending: OBSTETRICS & GYNECOLOGY
Payer: COMMERCIAL

## 2022-04-27 VITALS
HEART RATE: 111 BPM | DIASTOLIC BLOOD PRESSURE: 75 MMHG | SYSTOLIC BLOOD PRESSURE: 103 MMHG | WEIGHT: 219 LBS | RESPIRATION RATE: 16 BRPM | BODY MASS INDEX: 38.79 KG/M2 | OXYGEN SATURATION: 97 %

## 2022-04-27 DIAGNOSIS — O09.93 SUPERVISION OF HIGH RISK PREGNANCY IN THIRD TRIMESTER: Primary | ICD-10-CM

## 2022-04-27 DIAGNOSIS — Z11.59 ENCOUNTER FOR SCREENING FOR OTHER VIRAL DISEASES: ICD-10-CM

## 2022-04-27 DIAGNOSIS — N89.8 VAGINAL DISCHARGE: ICD-10-CM

## 2022-04-27 DIAGNOSIS — O99.019 ANEMIA DURING PREGNANCY: ICD-10-CM

## 2022-04-27 LAB
CLUE CELLS: ABNORMAL
SARS-COV-2 RNA RESP QL NAA+PROBE: NEGATIVE
TRICHOMONAS, WET PREP: ABNORMAL
WBC'S/HIGH POWER FIELD, WET PREP: ABNORMAL
YEAST, WET PREP: ABNORMAL

## 2022-04-27 PROCEDURE — U0005 INFEC AGEN DETEC AMPLI PROBE: HCPCS | Performed by: ADVANCED PRACTICE MIDWIFE

## 2022-04-27 PROCEDURE — 76816 OB US FOLLOW-UP PER FETUS: CPT | Mod: 26 | Performed by: OBSTETRICS & GYNECOLOGY

## 2022-04-27 PROCEDURE — G0463 HOSPITAL OUTPT CLINIC VISIT: HCPCS | Mod: 25

## 2022-04-27 PROCEDURE — 76818 FETAL BIOPHYS PROFILE W/NST: CPT | Mod: 26 | Performed by: OBSTETRICS & GYNECOLOGY

## 2022-04-27 PROCEDURE — 76819 FETAL BIOPHYS PROFIL W/O NST: CPT

## 2022-04-27 PROCEDURE — 87210 SMEAR WET MOUNT SALINE/INK: CPT | Performed by: ADVANCED PRACTICE MIDWIFE

## 2022-04-27 PROCEDURE — 99213 OFFICE O/P EST LOW 20 MIN: CPT | Mod: 25 | Performed by: ADVANCED PRACTICE MIDWIFE

## 2022-04-27 RX ORDER — CALCIUM CARBONATE 500 MG/1
1 TABLET, CHEWABLE ORAL 2 TIMES DAILY
COMMUNITY

## 2022-04-27 RX ORDER — NALOXONE HYDROCHLORIDE 0.4 MG/ML
0.2 INJECTION, SOLUTION INTRAMUSCULAR; INTRAVENOUS; SUBCUTANEOUS
Status: CANCELLED | OUTPATIENT
Start: 2022-04-27

## 2022-04-27 RX ORDER — METHYLPREDNISOLONE SODIUM SUCCINATE 125 MG/2ML
125 INJECTION, POWDER, LYOPHILIZED, FOR SOLUTION INTRAMUSCULAR; INTRAVENOUS
Status: CANCELLED
Start: 2022-04-27

## 2022-04-27 RX ORDER — ALBUTEROL SULFATE 90 UG/1
1-2 AEROSOL, METERED RESPIRATORY (INHALATION)
Status: CANCELLED
Start: 2022-04-27

## 2022-04-27 RX ORDER — HEPARIN SODIUM,PORCINE 10 UNIT/ML
5 VIAL (ML) INTRAVENOUS
Status: CANCELLED | OUTPATIENT
Start: 2022-04-27

## 2022-04-27 RX ORDER — HYDROXYZINE PAMOATE 25 MG/1
CAPSULE ORAL
COMMUNITY
Start: 2022-04-25

## 2022-04-27 RX ORDER — EPINEPHRINE 1 MG/ML
0.3 INJECTION, SOLUTION, CONCENTRATE INTRAVENOUS EVERY 5 MIN PRN
Status: CANCELLED | OUTPATIENT
Start: 2022-04-27

## 2022-04-27 RX ORDER — HEPARIN SODIUM (PORCINE) LOCK FLUSH IV SOLN 100 UNIT/ML 100 UNIT/ML
5 SOLUTION INTRAVENOUS
Status: CANCELLED | OUTPATIENT
Start: 2022-04-27

## 2022-04-27 RX ORDER — DIPHENHYDRAMINE HYDROCHLORIDE 50 MG/ML
50 INJECTION INTRAMUSCULAR; INTRAVENOUS
Status: CANCELLED
Start: 2022-04-27

## 2022-04-27 RX ORDER — ALBUTEROL SULFATE 0.83 MG/ML
2.5 SOLUTION RESPIRATORY (INHALATION)
Status: CANCELLED | OUTPATIENT
Start: 2022-04-27

## 2022-04-27 NOTE — PROGRESS NOTES
"Maternal fetal Medicine Transfer of care OB visit.     Maite Gates  : 1995  MRN: [id number]    CC: OB Follow-up    Subjective:  Maite Gates is a 26 year old  at 37w4d presenting for routine OB follow-up. Today, she is here with Derick and reports feeling well overall. Does endorse some unusual vaginal discharge for the past 2 days. Says it is thin and white to clear in consistency and color. No vaginal pain/irritation, unusual odor. Reports it happening at various times throughout the day, but does not notice it constantly or running down her leg. Denies vaginal bleeding. Reports normal fetal movement.    She has been noting more frequent contractions for the past week. Feels they were most intense and consistent after discharge from the hospital for evaluation of fetal heart rate. States they occur more at night, many are not painful, but there have been times where she has had disrupted sleep due to contractions.    She and Derick have questions about whether the baby will need admission to the NICU and what follow up may be needed.      OB Hx:  OB History    Para Term  AB Living   3 1 1 0 1 1   SAB IAB Ectopic Multiple Live Births   1 0 0 0 1      # Outcome Date GA Lbr Aman/2nd Weight Sex Delivery Anes PTL Lv   3 Current            2 SAB  5w0d          1 Term  41w0d  4.309 kg (9 lb 8 oz) M CS-Unspec   VERONICA         Objective:  /75 (BP Location: Right arm, Patient Position: Sitting, Cuff Size: Adult Regular)   Pulse 111   Resp 16   Wt 99.3 kg (219 lb)   LMP 2021   SpO2 97%   BMI 38.79 kg/m      Gen: alert, oriented, NAD  Skin: warm, dry, intact  Respiratory: breathing unlabored, no SOB  Abdominal: gravid, non-tender  Pelvic: external genitalia WNL. SSE: No obvious discharge or leaking fluid coming from cervical os. Appears long and closed.  Extremities: WNL  Psych: mood WNL, behavior WNL      OB Ultrasound:  Please see \"imaging\" tab under chart review for " today's ultrasound results.      Assessment/Plan:  26 year old  at 37w4d here for follow OB visit.    Pregnancy has been complicated by:   - Fetal SVT and PACs  - Hx of c/s x1        #Routine PNC:  - Prenatal labs:  Rh: +  antibody: neg   HepB/HIV/RPR: nonreactive   GC/CT: negative   Rubella: immune     GCT: pass  - Immunizations:  s/p TDap and Covid x2  - GBS negative  - Wet prep: negative. SSE reassuring for intact membranes and normal physiologic discharge in pregnancy. Reviewed precautions.    #Fetal SVT and PACs:  # Surveillance:  - BPP 3x week to assess for arrhythmia  - Will not receive  echo, but will need  ECG and echo.  - NICU for delivery, with admission TBD based on transition after birth.    #Delivery planning:  - Repeat c/s on  at 12:30pm with [facility] staff.  - Will plan for pre-op labs and covid swab prior to surgery.  - Feeding: needs to be discussed  - Contraception plans: needs to be discussed      20 minutes spent on the date of the encounter, doing chart review, history and exam, documentation and further activities as noted.      Lizbeth Ryan CNM on 2022 at 11:08 AM

## 2022-04-27 NOTE — PROGRESS NOTES
Please see full imaging report from [employer] program under imaging tab.    Thank-you for referring your patient for  testing due to a prior finding of a fetal arrhythmia, which is not present today on US or NST.   She is transferring care for delivery here at [facility] due to potential need for  cardiac evaluation - please see first OB visit today.     She will not have a fetal echo before delivery but we do recommend cardiac assessment by the pediatric team (EKG and echo) before discharge.   We will discuss with cardiology team whether a NICU admit is warranted.     We discussed the results of the ultrasound with the patient and her partner.      Continue  surveillance as previously recommended until anticipated delivery.      Rubina Guy MD  Maternal Fetal Medicine

## 2022-04-27 NOTE — NURSING NOTE
Maite seen in clinic today for [facility] OB visit at 37w4d gestation for pregnancy complicated by fetal SVT/PAC history-- normal rhythm/NST today (see report/notes). VSS, HR elevated, pt asymptomatic. Pt reports positive fetal movement, see flowsheet. Pt denies bldg/lof/change in discharge/contractions/headache/vision changes/chest pain/SOB/edema. Reviewed s/sx requiring urgent assessment, pt VU. Packet provided. Pt with questions regarding  f/u, reviewed curbside with Dr. Mazariegos who offers likely plan to keep baby in NBN w/  EKG/echo and discharge w/ Holter. Pt informed of this. Dr. Guy met with pt and discussed POC. Plan for 3x/wk monitoring as scheduled, IV infusion scheduled for this Friday (will need 2 more) so US r/s to [facility] from . Pt given calendar. Reviewed pre-op instructions/packet. Will provide soap/preop drink at next visit. Pre-infusion COVID test collected today, pt aware she will need additional pre-op, has this scheduled for . Future visits scheduled at . Pt discharged stable and ambulatory.

## 2022-04-29 ENCOUNTER — OFFICE VISIT (OUTPATIENT)
Dept: MATERNAL FETAL MEDICINE | Facility: CLINIC | Age: 27
End: 2022-04-29
Attending: OBSTETRICS & GYNECOLOGY
Payer: COMMERCIAL

## 2022-04-29 ENCOUNTER — HOSPITAL ENCOUNTER (OUTPATIENT)
Dept: ULTRASOUND IMAGING | Facility: CLINIC | Age: 27
Discharge: HOME | End: 2022-04-29
Attending: OBSTETRICS & GYNECOLOGY
Payer: COMMERCIAL

## 2022-04-29 ENCOUNTER — INFUSION THERAPY VISIT (OUTPATIENT)
Dept: INFUSION THERAPY | Facility: CLINIC | Age: 27
End: 2022-04-29
Attending: OBSTETRICS & GYNECOLOGY
Payer: COMMERCIAL

## 2022-04-29 VITALS
TEMPERATURE: 98.1 F | OXYGEN SATURATION: 96 % | RESPIRATION RATE: 18 BRPM | HEART RATE: 100 BPM | SYSTOLIC BLOOD PRESSURE: 112 MMHG | DIASTOLIC BLOOD PRESSURE: 73 MMHG

## 2022-04-29 DIAGNOSIS — O09.93 SUPERVISION OF HIGH RISK PREGNANCY IN THIRD TRIMESTER: Primary | ICD-10-CM

## 2022-04-29 DIAGNOSIS — O99.019 ANEMIA DURING PREGNANCY: ICD-10-CM

## 2022-04-29 PROCEDURE — 76816 OB US FOLLOW-UP PER FETUS: CPT | Mod: 26 | Performed by: OBSTETRICS & GYNECOLOGY

## 2022-04-29 PROCEDURE — 258N000003 HC RX IP 258 OP 636: Performed by: ADVANCED PRACTICE MIDWIFE

## 2022-04-29 PROCEDURE — 96374 THER/PROPH/DIAG INJ IV PUSH: CPT

## 2022-04-29 PROCEDURE — 250N000011 HC RX IP 250 OP 636: Performed by: ADVANCED PRACTICE MIDWIFE

## 2022-04-29 PROCEDURE — 59025 FETAL NON-STRESS TEST: CPT

## 2022-04-29 PROCEDURE — 76818 FETAL BIOPHYS PROFILE W/NST: CPT | Mod: 26 | Performed by: OBSTETRICS & GYNECOLOGY

## 2022-04-29 PROCEDURE — 76819 FETAL BIOPHYS PROFIL W/O NST: CPT

## 2022-04-29 RX ORDER — HEPARIN SODIUM,PORCINE 10 UNIT/ML
5 VIAL (ML) INTRAVENOUS
Status: CANCELLED | OUTPATIENT
Start: 2022-04-29

## 2022-04-29 RX ORDER — NALOXONE HYDROCHLORIDE 0.4 MG/ML
0.2 INJECTION, SOLUTION INTRAMUSCULAR; INTRAVENOUS; SUBCUTANEOUS
Status: CANCELLED | OUTPATIENT
Start: 2022-04-29

## 2022-04-29 RX ORDER — DIPHENHYDRAMINE HYDROCHLORIDE 50 MG/ML
50 INJECTION INTRAMUSCULAR; INTRAVENOUS
Status: CANCELLED
Start: 2022-04-29

## 2022-04-29 RX ORDER — HEPARIN SODIUM (PORCINE) LOCK FLUSH IV SOLN 100 UNIT/ML 100 UNIT/ML
5 SOLUTION INTRAVENOUS
Status: CANCELLED | OUTPATIENT
Start: 2022-04-29

## 2022-04-29 RX ORDER — ALBUTEROL SULFATE 90 UG/1
1-2 AEROSOL, METERED RESPIRATORY (INHALATION)
Status: CANCELLED
Start: 2022-04-29

## 2022-04-29 RX ORDER — ALBUTEROL SULFATE 0.83 MG/ML
2.5 SOLUTION RESPIRATORY (INHALATION)
Status: CANCELLED | OUTPATIENT
Start: 2022-04-29

## 2022-04-29 RX ORDER — EPINEPHRINE 1 MG/ML
0.3 INJECTION, SOLUTION, CONCENTRATE INTRAVENOUS EVERY 5 MIN PRN
Status: CANCELLED | OUTPATIENT
Start: 2022-04-29

## 2022-04-29 RX ORDER — METHYLPREDNISOLONE SODIUM SUCCINATE 125 MG/2ML
125 INJECTION, POWDER, LYOPHILIZED, FOR SOLUTION INTRAMUSCULAR; INTRAVENOUS
Status: CANCELLED
Start: 2022-04-29

## 2022-04-29 RX ADMIN — IRON SUCROSE 300 MG: 20 INJECTION, SOLUTION INTRAVENOUS at 13:27

## 2022-04-29 ASSESSMENT — PAIN SCALES - GENERAL: PAINLEVEL: NO PAIN (0)

## 2022-04-29 NOTE — PROGRESS NOTES
The patient was seen for an ultrasound in the Maternal-Fetal Medicine Center at the [facility] today.  For a detailed report of the ultrasound examination, please see the ultrasound report which can be found under the imaging tab.    Vijaya Booker MD  , OB/GYN  Maternal-Fetal Medicine  [phone] (Pager)

## 2022-04-29 NOTE — PROGRESS NOTES
Infusion Nursing Note:  Maitejairo Gates presents today for Venofer.    Patient seen by provider today: Yes:    present during visit today: Not Applicable.    Note: Given print out on med.      Intravenous Access:  Peripheral IV placed.    Treatment Conditions:  Not Applicable.      Post Infusion Assessment:  Patient tolerated infusion without incident.  No evidence of extravasations.  Access discontinued per protocol.       Discharge Plan:   Patient and/or family verbalized understanding of discharge instructions and all questions answered.  Patient discharged in stable condition accompanied by: self.      TARSHA GALVAN RN

## 2022-04-29 NOTE — NURSING NOTE
NST Performed due to fetal arrhythmia.  Dr. Booker reviewed efm tracing. See NST/BPP Doc Flowsheet tab. Pt scheduled for follow up on Monday at .     Janna Gomez RN

## 2022-05-04 ENCOUNTER — INFUSION THERAPY VISIT (OUTPATIENT)
Dept: INFUSION THERAPY | Facility: CLINIC | Age: 27
End: 2022-05-04
Attending: OBSTETRICS & GYNECOLOGY
Payer: COMMERCIAL

## 2022-05-04 ENCOUNTER — OFFICE VISIT (OUTPATIENT)
Dept: MATERNAL FETAL MEDICINE | Facility: CLINIC | Age: 27
End: 2022-05-04
Attending: OBSTETRICS & GYNECOLOGY
Payer: COMMERCIAL

## 2022-05-04 ENCOUNTER — HOSPITAL ENCOUNTER (OUTPATIENT)
Dept: ULTRASOUND IMAGING | Facility: CLINIC | Age: 27
Discharge: HOME | End: 2022-05-04
Attending: OBSTETRICS & GYNECOLOGY
Payer: COMMERCIAL

## 2022-05-04 VITALS
SYSTOLIC BLOOD PRESSURE: 110 MMHG | HEART RATE: 100 BPM | RESPIRATION RATE: 16 BRPM | DIASTOLIC BLOOD PRESSURE: 73 MMHG | WEIGHT: 222 LBS | OXYGEN SATURATION: 97 % | BODY MASS INDEX: 39.33 KG/M2

## 2022-05-04 VITALS
HEART RATE: 107 BPM | DIASTOLIC BLOOD PRESSURE: 80 MMHG | RESPIRATION RATE: 18 BRPM | SYSTOLIC BLOOD PRESSURE: 122 MMHG | TEMPERATURE: 97.9 F

## 2022-05-04 DIAGNOSIS — O36.8390 FETAL ARRHYTHMIA AFFECTING PREGNANCY, ANTEPARTUM: Primary | ICD-10-CM

## 2022-05-04 DIAGNOSIS — Z98.891 HISTORY OF CESAREAN SECTION: ICD-10-CM

## 2022-05-04 DIAGNOSIS — O99.019 ANEMIA DURING PREGNANCY: ICD-10-CM

## 2022-05-04 PROCEDURE — G0463 HOSPITAL OUTPT CLINIC VISIT: HCPCS | Mod: 25

## 2022-05-04 PROCEDURE — 76818 FETAL BIOPHYS PROFILE W/NST: CPT | Mod: 26 | Performed by: OBSTETRICS & GYNECOLOGY

## 2022-05-04 PROCEDURE — 76816 OB US FOLLOW-UP PER FETUS: CPT | Mod: 26 | Performed by: OBSTETRICS & GYNECOLOGY

## 2022-05-04 PROCEDURE — 96365 THER/PROPH/DIAG IV INF INIT: CPT

## 2022-05-04 PROCEDURE — 59025 FETAL NON-STRESS TEST: CPT | Mod: 59

## 2022-05-04 PROCEDURE — 250N000011 HC RX IP 250 OP 636: Performed by: ADVANCED PRACTICE MIDWIFE

## 2022-05-04 PROCEDURE — 258N000003 HC RX IP 258 OP 636: Performed by: ADVANCED PRACTICE MIDWIFE

## 2022-05-04 PROCEDURE — 99213 OFFICE O/P EST LOW 20 MIN: CPT | Mod: 25 | Performed by: ADVANCED PRACTICE MIDWIFE

## 2022-05-04 PROCEDURE — 76816 OB US FOLLOW-UP PER FETUS: CPT

## 2022-05-04 PROCEDURE — 76819 FETAL BIOPHYS PROFIL W/O NST: CPT

## 2022-05-04 RX ORDER — ALBUTEROL SULFATE 0.83 MG/ML
2.5 SOLUTION RESPIRATORY (INHALATION)
Status: CANCELLED | OUTPATIENT
Start: 2022-05-04

## 2022-05-04 RX ORDER — OXYTOCIN/0.9 % SODIUM CHLORIDE 30/500 ML
100-340 PLASTIC BAG, INJECTION (ML) INTRAVENOUS CONTINUOUS PRN
Status: CANCELLED | OUTPATIENT
Start: 2022-05-04

## 2022-05-04 RX ORDER — SODIUM CHLORIDE, SODIUM LACTATE, POTASSIUM CHLORIDE, CALCIUM CHLORIDE 600; 310; 30; 20 MG/100ML; MG/100ML; MG/100ML; MG/100ML
INJECTION, SOLUTION INTRAVENOUS CONTINUOUS
Status: CANCELLED | OUTPATIENT
Start: 2022-05-04

## 2022-05-04 RX ORDER — METHYLERGONOVINE MALEATE 0.2 MG/ML
200 INJECTION INTRAVENOUS
Status: CANCELLED | OUTPATIENT
Start: 2022-05-04

## 2022-05-04 RX ORDER — ACETAMINOPHEN 325 MG/1
975 TABLET ORAL ONCE
Status: CANCELLED | OUTPATIENT
Start: 2022-05-04 | End: 2022-05-04

## 2022-05-04 RX ORDER — OXYTOCIN 10 [USP'U]/ML
10 INJECTION, SOLUTION INTRAMUSCULAR; INTRAVENOUS
Status: CANCELLED | OUTPATIENT
Start: 2022-05-04

## 2022-05-04 RX ORDER — TRANEXAMIC ACID 10 MG/ML
1 INJECTION, SOLUTION INTRAVENOUS EVERY 30 MIN PRN
Status: CANCELLED | OUTPATIENT
Start: 2022-05-04

## 2022-05-04 RX ORDER — EPINEPHRINE 1 MG/ML
0.3 INJECTION, SOLUTION, CONCENTRATE INTRAVENOUS EVERY 5 MIN PRN
Status: CANCELLED | OUTPATIENT
Start: 2022-05-04

## 2022-05-04 RX ORDER — DIPHENHYDRAMINE HYDROCHLORIDE 50 MG/ML
50 INJECTION INTRAMUSCULAR; INTRAVENOUS
Status: CANCELLED
Start: 2022-05-04

## 2022-05-04 RX ORDER — NALOXONE HYDROCHLORIDE 0.4 MG/ML
0.2 INJECTION, SOLUTION INTRAMUSCULAR; INTRAVENOUS; SUBCUTANEOUS
Status: CANCELLED | OUTPATIENT
Start: 2022-05-04

## 2022-05-04 RX ORDER — MISOPROSTOL 200 UG/1
800 TABLET ORAL
Status: CANCELLED | OUTPATIENT
Start: 2022-05-04

## 2022-05-04 RX ORDER — OXYTOCIN/0.9 % SODIUM CHLORIDE 30/500 ML
340 PLASTIC BAG, INJECTION (ML) INTRAVENOUS CONTINUOUS PRN
Status: CANCELLED | OUTPATIENT
Start: 2022-05-04

## 2022-05-04 RX ORDER — CEFAZOLIN SODIUM 2 G/100ML
2 INJECTION, SOLUTION INTRAVENOUS SEE ADMIN INSTRUCTIONS
Status: CANCELLED | OUTPATIENT
Start: 2022-05-04

## 2022-05-04 RX ORDER — HEPARIN SODIUM (PORCINE) LOCK FLUSH IV SOLN 100 UNIT/ML 100 UNIT/ML
5 SOLUTION INTRAVENOUS
Status: CANCELLED | OUTPATIENT
Start: 2022-05-04

## 2022-05-04 RX ORDER — CARBOPROST TROMETHAMINE 250 UG/ML
250 INJECTION, SOLUTION INTRAMUSCULAR
Status: CANCELLED | OUTPATIENT
Start: 2022-05-04

## 2022-05-04 RX ORDER — ALBUTEROL SULFATE 90 UG/1
1-2 AEROSOL, METERED RESPIRATORY (INHALATION)
Status: CANCELLED
Start: 2022-05-04

## 2022-05-04 RX ORDER — CITRIC ACID/SODIUM CITRATE 334-500MG
30 SOLUTION, ORAL ORAL
Status: CANCELLED | OUTPATIENT
Start: 2022-05-04

## 2022-05-04 RX ORDER — METHYLPREDNISOLONE SODIUM SUCCINATE 125 MG/2ML
125 INJECTION, POWDER, LYOPHILIZED, FOR SOLUTION INTRAMUSCULAR; INTRAVENOUS
Status: CANCELLED
Start: 2022-05-04

## 2022-05-04 RX ORDER — CEFAZOLIN SODIUM 2 G/100ML
2 INJECTION, SOLUTION INTRAVENOUS
Status: CANCELLED | OUTPATIENT
Start: 2022-05-04

## 2022-05-04 RX ORDER — LIDOCAINE 40 MG/G
CREAM TOPICAL
Status: CANCELLED | OUTPATIENT
Start: 2022-05-04

## 2022-05-04 RX ORDER — MISOPROSTOL 200 UG/1
400 TABLET ORAL
Status: CANCELLED | OUTPATIENT
Start: 2022-05-04

## 2022-05-04 RX ORDER — HEPARIN SODIUM,PORCINE 10 UNIT/ML
5 VIAL (ML) INTRAVENOUS
Status: CANCELLED | OUTPATIENT
Start: 2022-05-04

## 2022-05-04 RX ADMIN — IRON SUCROSE 300 MG: 20 INJECTION, SOLUTION INTRAVENOUS at 13:59

## 2022-05-04 RX ADMIN — SODIUM CHLORIDE 250 ML: 9 INJECTION, SOLUTION INTRAVENOUS at 15:24

## 2022-05-04 NOTE — NURSING NOTE
NST Performed due to hx fetal SVT.  Dr. Peñaloza reviewed efm tracing. See NST/BPP Doc Flowsheet tab.

## 2022-05-04 NOTE — PROGRESS NOTES
"Please see \"Imaging\" tab under \"Chart Review\" for details of today's visit.    Orville Peñaloza    "

## 2022-05-04 NOTE — PROGRESS NOTES
Infusion Nursing Note:  Maite Gates presents today for iron infusion.    Patient seen by provider today: Yes   present during visit today: Not Applicable.    Note: Energy is at level she expects.      Intravenous Access:  Peripheral IV placed.    Treatment Conditions:  Not Applicable.      Post Infusion Assessment:  Patient tolerated infusion without incident.  Patient not observed  post infusion per protocol. Had iron infusion before with no reactions.  Site patent and intact, free from redness, edema or discomfort.  Access discontinued per protocol.       Discharge Plan:   Patient discharged in stable condition accompanied by: self.  To return as needed. Discharged ambulatory, gait steady.    KODI SINGH RN

## 2022-05-04 NOTE — NURSING NOTE
Maite seen in clinic today for OB visit at 38w4d gestation for pregnancy complicated by prior fetal SVT/PACs (see report/notes). VSS. Pt reports positive fetal movement, see flowsheet. NST for hx fetal arrhythmia, see flowsheets, reviewed with Dr. Peñaloza. Pt denies bldg/lof/change in discharge/contractions/headache/vision changes/chest pain/SOB/edema. Lizbeth Ryan CNM met with pt and discussed POC. Plan for preop labs/covid swab on Fri at  lab, infusion r/s to 1330 today, will not get 3rd dose of venofer prior to surgery on Monday d/t infusion appt availability. Soap/preop drink provided today, instructions reviewed. RL2/BPP/NST scheduled on Fri at [facility]. Pt discharged stable and ambulatory.

## 2022-05-04 NOTE — PROGRESS NOTES
"Maternal fetal Medicine OB Follow up visit.     Maite Gates  : 1995  MRN: [id number]    CC: OB Follow-up    Subjective:  Maite Gates is a 26 year old  at 38w4d presenting for routine OB follow-up. Today, she is feeling well. Offers no pregnancy concerns. Reports normal fetal movement, feeling occasional, mild contractions. Denies loss of fluid or vaginal bleeding.      She is feeling ready, but somewhat anxious about her upcoming delivery. Her last birth was traumatic for her. She was induced, but did not progress past 5cm and baby was malpresenting so a c/s was recommended. Her epidural did not provide adequate pain coverage and therefore transitioned to GETA. There was an infant code blue called for her baby, but she was told about this after she awoke from anesthesia. She is hopeful that her planned c/s will go smoother.    She has been able to complete 1 IV iron infusion and has a second one scheduled for later this afternoon. Does not think there will be an appointment for her third infusion prior to delivery.    OB Hx:  OB History    Para Term  AB Living   3 1 1 0 1 1   SAB IAB Ectopic Multiple Live Births   1 0 0 0 1      # Outcome Date GA Lbr Aman/2nd Weight Sex Delivery Anes PTL Lv   3 Current            2 SAB  5w0d          1 Term  41w0d  4.309 kg (9 lb 8 oz) M CS-Unspec   VERONICA         Objective:  /73 (BP Location: Left arm, Patient Position: Sitting, Cuff Size: Adult Regular)   Pulse 100   Resp 16   Wt 100.7 kg (222 lb)   LMP 2021   SpO2 97%   BMI 39.33 kg/m      Gen: alert, oriented, NAD  Skin: warm, dry, intact  Respiratory: breathing unlabored, no SOB  Abdominal: gravid, non-tender  Pelvic: deferred  Extremities: WNL  Psych: mood stable, behavior WNL      OB Ultrasound:  Please see \"imaging\" tab under chart review for today's ultrasound results.      Assessment/Plan:  26 year old  at 38w4d here for follow OB visit.    Pregnancy has been " complicated by:   - Fetal SVT and PACs  - Hx of c/s x1           #Routine PNC:  - Prenatal labs:  Rh: +  antibody: neg               HepB/HIV/RPR: nonreactive               GC/CT: negative               Rubella: immune                 GCT: pass  - Immunizations:  s/p TDap and Covid x2  - GBS negative  - Wet prep: negative. SSE reassuring for intact membranes and normal physiologic discharge in pregnancy. Reviewed precautions.     #Fetal SVT and PACs:  # Surveillance:  - BPP 3x week to assess for arrhythmia  - Will not receive  echo, but will need  ECG and echo.  - NICU for delivery, with admission TBD based on transition after birth.     #Delivery planning:  - Repeat c/s on  at 12:30pm with [facility] staff.  - Will plan for pre-op labs and covid swab on   - Feeding: breastfeeding      20 minutes spent on the date of the encounter, doing chart review, history and exam, documentation and further activities as noted.      Lizbeth Ryan CNM on 2022 at 12:49 PM

## 2022-05-06 ENCOUNTER — LAB (OUTPATIENT)
Dept: LAB | Facility: CLINIC | Age: 27
End: 2022-05-06
Payer: COMMERCIAL

## 2022-05-06 ENCOUNTER — LAB (OUTPATIENT)
Dept: LAB | Facility: CLINIC | Age: 27
End: 2022-05-06
Attending: OBSTETRICS & GYNECOLOGY

## 2022-05-06 DIAGNOSIS — O99.019 ANEMIA DURING PREGNANCY: ICD-10-CM

## 2022-05-06 DIAGNOSIS — Z98.891 HISTORY OF CESAREAN SECTION: ICD-10-CM

## 2022-05-06 LAB
ABO/RH(D): NORMAL
ANTIBODY SCREEN: NEGATIVE
ERYTHROCYTE [DISTWIDTH] IN BLOOD BY AUTOMATED COUNT: 16 % (ref 10–15)
HCT VFR BLD AUTO: 31.3 % (ref 35–47)
HGB BLD-MCNC: 10 G/DL (ref 11.7–15.7)
MCH RBC QN AUTO: 27.6 PG (ref 26.5–33)
MCHC RBC AUTO-ENTMCNC: 31.9 G/DL (ref 31.5–36.5)
MCV RBC AUTO: 87 FL (ref 78–100)
PLATELET # BLD AUTO: 174 10E3/UL (ref 150–450)
RBC # BLD AUTO: 3.62 10E6/UL (ref 3.8–5.2)
SPECIMEN EXPIRATION DATE: NORMAL
WBC # BLD AUTO: 12.7 10E3/UL (ref 4–11)

## 2022-05-06 PROCEDURE — U0003 INFECTIOUS AGENT DETECTION BY NUCLEIC ACID (DNA OR RNA); SEVERE ACUTE RESPIRATORY SYNDROME CORONAVIRUS 2 (SARS-COV-2) (CORONAVIRUS DISEASE [COVID-19]), AMPLIFIED PROBE TECHNIQUE, MAKING USE OF HIGH THROUGHPUT TECHNOLOGIES AS DESCRIBED BY CMS-2020-01-R: HCPCS

## 2022-05-06 PROCEDURE — U0005 INFEC AGEN DETEC AMPLI PROBE: HCPCS

## 2022-05-06 PROCEDURE — 36415 COLL VENOUS BLD VENIPUNCTURE: CPT

## 2022-05-06 PROCEDURE — 86850 RBC ANTIBODY SCREEN: CPT

## 2022-05-06 PROCEDURE — 86901 BLOOD TYPING SEROLOGIC RH(D): CPT

## 2022-05-06 PROCEDURE — 86900 BLOOD TYPING SEROLOGIC ABO: CPT

## 2022-05-06 PROCEDURE — 85027 COMPLETE CBC AUTOMATED: CPT

## 2022-05-07 LAB — SARS-COV-2 RNA RESP QL NAA+PROBE: NEGATIVE

## 2022-05-08 ENCOUNTER — ANESTHESIA EVENT (OUTPATIENT)
Dept: OBGYN | Facility: CLINIC | Age: 27
End: 2022-05-08
Payer: COMMERCIAL

## 2022-05-08 ENCOUNTER — ANESTHESIA (OUTPATIENT)
Dept: OBGYN | Facility: CLINIC | Age: 27
End: 2022-05-08
Payer: COMMERCIAL

## 2022-05-08 ENCOUNTER — HOSPITAL ENCOUNTER (INPATIENT)
Facility: CLINIC | Age: 27
LOS: 3 days | Discharge: HOME | End: 2022-05-11
Attending: OBSTETRICS & GYNECOLOGY | Admitting: OBSTETRICS & GYNECOLOGY
Payer: COMMERCIAL

## 2022-05-08 DIAGNOSIS — G89.18 ACUTE POST-OPERATIVE PAIN: ICD-10-CM

## 2022-05-08 DIAGNOSIS — O36.8390 NON-REASSURING ELECTRONIC FETAL MONITORING TRACING: ICD-10-CM

## 2022-05-08 DIAGNOSIS — Z98.891 S/P CESAREAN SECTION: Primary | ICD-10-CM

## 2022-05-08 PROBLEM — Z34.90 PREGNANCY: Status: ACTIVE | Noted: 2022-05-08

## 2022-05-08 LAB — T PALLIDUM AB SER QL: NONREACTIVE

## 2022-05-08 PROCEDURE — 258N000003 HC RX IP 258 OP 636

## 2022-05-08 PROCEDURE — 87653 STREP B DNA AMP PROBE: CPT

## 2022-05-08 PROCEDURE — 250N000011 HC RX IP 250 OP 636: Performed by: STUDENT IN AN ORGANIZED HEALTH CARE EDUCATION/TRAINING PROGRAM

## 2022-05-08 PROCEDURE — 999N000141 HC STATISTIC PRE-PROCEDURE NURSING ASSESSMENT: Performed by: OBSTETRICS & GYNECOLOGY

## 2022-05-08 PROCEDURE — 120N000002 HC R&B MED SURG/OB UMMC

## 2022-05-08 PROCEDURE — 250N000013 HC RX MED GY IP 250 OP 250 PS 637: Performed by: STUDENT IN AN ORGANIZED HEALTH CARE EDUCATION/TRAINING PROGRAM

## 2022-05-08 PROCEDURE — 250N000011 HC RX IP 250 OP 636: Performed by: OBSTETRICS & GYNECOLOGY

## 2022-05-08 PROCEDURE — 272N000001 HC OR GENERAL SUPPLY STERILE: Performed by: OBSTETRICS & GYNECOLOGY

## 2022-05-08 PROCEDURE — 710N000010 HC RECOVERY PHASE 1, LEVEL 2, PER MIN: Performed by: OBSTETRICS & GYNECOLOGY

## 2022-05-08 PROCEDURE — 250N000009 HC RX 250: Performed by: STUDENT IN AN ORGANIZED HEALTH CARE EDUCATION/TRAINING PROGRAM

## 2022-05-08 PROCEDURE — 96372 THER/PROPH/DIAG INJ SC/IM: CPT | Performed by: STUDENT IN AN ORGANIZED HEALTH CARE EDUCATION/TRAINING PROGRAM

## 2022-05-08 PROCEDURE — 370N000017 HC ANESTHESIA TECHNICAL FEE, PER MIN: Performed by: OBSTETRICS & GYNECOLOGY

## 2022-05-08 PROCEDURE — 59514 CESAREAN DELIVERY ONLY: CPT | Mod: GC | Performed by: OBSTETRICS & GYNECOLOGY

## 2022-05-08 PROCEDURE — 258N000003 HC RX IP 258 OP 636: Performed by: STUDENT IN AN ORGANIZED HEALTH CARE EDUCATION/TRAINING PROGRAM

## 2022-05-08 PROCEDURE — 86780 TREPONEMA PALLIDUM: CPT | Performed by: STUDENT IN AN ORGANIZED HEALTH CARE EDUCATION/TRAINING PROGRAM

## 2022-05-08 PROCEDURE — 271N000001 HC OR GENERAL SUPPLY NON-STERILE: Performed by: OBSTETRICS & GYNECOLOGY

## 2022-05-08 PROCEDURE — 250N000013 HC RX MED GY IP 250 OP 250 PS 637: Performed by: OBSTETRICS & GYNECOLOGY

## 2022-05-08 PROCEDURE — 360N000076 HC SURGERY LEVEL 3, PER MIN: Performed by: OBSTETRICS & GYNECOLOGY

## 2022-05-08 PROCEDURE — 250N000011 HC RX IP 250 OP 636

## 2022-05-08 PROCEDURE — C9290 INJ, BUPIVACAINE LIPOSOME: HCPCS | Performed by: STUDENT IN AN ORGANIZED HEALTH CARE EDUCATION/TRAINING PROGRAM

## 2022-05-08 RX ORDER — AZITHROMYCIN 500 MG/5ML
500 INJECTION, POWDER, LYOPHILIZED, FOR SOLUTION INTRAVENOUS EVERY 24 HOURS
Status: DISCONTINUED | OUTPATIENT
Start: 2022-05-08 | End: 2022-05-08

## 2022-05-08 RX ORDER — LIDOCAINE 40 MG/G
CREAM TOPICAL
Status: DISCONTINUED | OUTPATIENT
Start: 2022-05-08 | End: 2022-05-08 | Stop reason: HOSPADM

## 2022-05-08 RX ORDER — CARBOPROST TROMETHAMINE 250 UG/ML
250 INJECTION, SOLUTION INTRAMUSCULAR
Status: DISCONTINUED | OUTPATIENT
Start: 2022-05-08 | End: 2022-05-11 | Stop reason: HOSPADM

## 2022-05-08 RX ORDER — ACETAMINOPHEN 325 MG/1
975 TABLET ORAL ONCE
Status: COMPLETED | OUTPATIENT
Start: 2022-05-08 | End: 2022-05-08

## 2022-05-08 RX ORDER — CARBOPROST TROMETHAMINE 250 UG/ML
250 INJECTION, SOLUTION INTRAMUSCULAR
Status: DISCONTINUED | OUTPATIENT
Start: 2022-05-08 | End: 2022-05-08 | Stop reason: HOSPADM

## 2022-05-08 RX ORDER — NALOXONE HYDROCHLORIDE 0.4 MG/ML
0.2 INJECTION, SOLUTION INTRAMUSCULAR; INTRAVENOUS; SUBCUTANEOUS
Status: DISCONTINUED | OUTPATIENT
Start: 2022-05-08 | End: 2022-05-08

## 2022-05-08 RX ORDER — SODIUM CHLORIDE, SODIUM LACTATE, POTASSIUM CHLORIDE, CALCIUM CHLORIDE 600; 310; 30; 20 MG/100ML; MG/100ML; MG/100ML; MG/100ML
INJECTION, SOLUTION INTRAVENOUS CONTINUOUS
Status: DISCONTINUED | OUTPATIENT
Start: 2022-05-08 | End: 2022-05-08 | Stop reason: HOSPADM

## 2022-05-08 RX ORDER — ONDANSETRON 4 MG/1
4 TABLET, ORALLY DISINTEGRATING ORAL EVERY 6 HOURS PRN
Status: DISCONTINUED | OUTPATIENT
Start: 2022-05-08 | End: 2022-05-11 | Stop reason: HOSPADM

## 2022-05-08 RX ORDER — FENTANYL CITRATE-0.9 % NACL/PF 10 MCG/ML
100 PLASTIC BAG, INJECTION (ML) INTRAVENOUS EVERY 5 MIN PRN
Status: DISCONTINUED | OUTPATIENT
Start: 2022-05-08 | End: 2022-05-08 | Stop reason: HOSPADM

## 2022-05-08 RX ORDER — OXYCODONE HYDROCHLORIDE 5 MG/1
5 TABLET ORAL EVERY 4 HOURS PRN
Status: DISCONTINUED | OUTPATIENT
Start: 2022-05-08 | End: 2022-05-11 | Stop reason: HOSPADM

## 2022-05-08 RX ORDER — TRANEXAMIC ACID 10 MG/ML
1 INJECTION, SOLUTION INTRAVENOUS EVERY 30 MIN PRN
Status: DISCONTINUED | OUTPATIENT
Start: 2022-05-08 | End: 2022-05-11 | Stop reason: HOSPADM

## 2022-05-08 RX ORDER — METOCLOPRAMIDE HYDROCHLORIDE 5 MG/ML
10 INJECTION INTRAMUSCULAR; INTRAVENOUS EVERY 6 HOURS PRN
Status: DISCONTINUED | OUTPATIENT
Start: 2022-05-08 | End: 2022-05-11 | Stop reason: HOSPADM

## 2022-05-08 RX ORDER — OXYTOCIN/0.9 % SODIUM CHLORIDE 30/500 ML
340 PLASTIC BAG, INJECTION (ML) INTRAVENOUS CONTINUOUS PRN
Status: COMPLETED | OUTPATIENT
Start: 2022-05-08 | End: 2022-05-08

## 2022-05-08 RX ORDER — MISOPROSTOL 200 UG/1
800 TABLET ORAL
Status: DISCONTINUED | OUTPATIENT
Start: 2022-05-08 | End: 2022-05-08 | Stop reason: HOSPADM

## 2022-05-08 RX ORDER — MORPHINE SULFATE 1 MG/ML
INJECTION, SOLUTION EPIDURAL; INTRATHECAL; INTRAVENOUS
Status: COMPLETED | OUTPATIENT
Start: 2022-05-08 | End: 2022-05-08

## 2022-05-08 RX ORDER — PROCHLORPERAZINE MALEATE 10 MG
10 TABLET ORAL EVERY 6 HOURS PRN
Status: DISCONTINUED | OUTPATIENT
Start: 2022-05-08 | End: 2022-05-11 | Stop reason: HOSPADM

## 2022-05-08 RX ORDER — LIDOCAINE 40 MG/G
CREAM TOPICAL
Status: DISCONTINUED | OUTPATIENT
Start: 2022-05-08 | End: 2022-05-11 | Stop reason: HOSPADM

## 2022-05-08 RX ORDER — DEXTROSE, SODIUM CHLORIDE, SODIUM LACTATE, POTASSIUM CHLORIDE, AND CALCIUM CHLORIDE 5; .6; .31; .03; .02 G/100ML; G/100ML; G/100ML; G/100ML; G/100ML
INJECTION, SOLUTION INTRAVENOUS CONTINUOUS
Status: DISCONTINUED | OUTPATIENT
Start: 2022-05-08 | End: 2022-05-11 | Stop reason: HOSPADM

## 2022-05-08 RX ORDER — TRANEXAMIC ACID 10 MG/ML
1 INJECTION, SOLUTION INTRAVENOUS EVERY 30 MIN PRN
Status: DISCONTINUED | OUTPATIENT
Start: 2022-05-08 | End: 2022-05-08 | Stop reason: HOSPADM

## 2022-05-08 RX ORDER — NALOXONE HYDROCHLORIDE 0.4 MG/ML
0.4 INJECTION, SOLUTION INTRAMUSCULAR; INTRAVENOUS; SUBCUTANEOUS
Status: DISCONTINUED | OUTPATIENT
Start: 2022-05-08 | End: 2022-05-08

## 2022-05-08 RX ORDER — CITRIC ACID/SODIUM CITRATE 334-500MG
30 SOLUTION, ORAL ORAL
Status: COMPLETED | OUTPATIENT
Start: 2022-05-08 | End: 2022-05-08

## 2022-05-08 RX ORDER — OXYTOCIN 10 [USP'U]/ML
10 INJECTION, SOLUTION INTRAMUSCULAR; INTRAVENOUS
Status: DISCONTINUED | OUTPATIENT
Start: 2022-05-08 | End: 2022-05-11 | Stop reason: HOSPADM

## 2022-05-08 RX ORDER — FENTANYL CITRATE 50 UG/ML
INJECTION, SOLUTION INTRAMUSCULAR; INTRAVENOUS
Status: COMPLETED | OUTPATIENT
Start: 2022-05-08 | End: 2022-05-08

## 2022-05-08 RX ORDER — IBUPROFEN 800 MG/1
800 TABLET, FILM COATED ORAL EVERY 6 HOURS
Status: DISCONTINUED | OUTPATIENT
Start: 2022-05-09 | End: 2022-05-08

## 2022-05-08 RX ORDER — AZITHROMYCIN 500 MG/5ML
500 INJECTION, POWDER, LYOPHILIZED, FOR SOLUTION INTRAVENOUS
Status: DISCONTINUED | OUTPATIENT
Start: 2022-05-08 | End: 2022-05-08

## 2022-05-08 RX ORDER — SIMETHICONE 80 MG
80 TABLET,CHEWABLE ORAL 4 TIMES DAILY PRN
Status: DISCONTINUED | OUTPATIENT
Start: 2022-05-08 | End: 2022-05-11 | Stop reason: HOSPADM

## 2022-05-08 RX ORDER — ONDANSETRON 2 MG/ML
INJECTION INTRAMUSCULAR; INTRAVENOUS PRN
Status: DISCONTINUED | OUTPATIENT
Start: 2022-05-08 | End: 2022-05-08

## 2022-05-08 RX ORDER — METOCLOPRAMIDE 10 MG/1
10 TABLET ORAL EVERY 6 HOURS PRN
Status: DISCONTINUED | OUTPATIENT
Start: 2022-05-08 | End: 2022-05-11 | Stop reason: HOSPADM

## 2022-05-08 RX ORDER — AMOXICILLIN 250 MG
2 CAPSULE ORAL 2 TIMES DAILY
Status: DISCONTINUED | OUTPATIENT
Start: 2022-05-08 | End: 2022-05-11 | Stop reason: HOSPADM

## 2022-05-08 RX ORDER — METHYLERGONOVINE MALEATE 0.2 MG/ML
200 INJECTION INTRAVENOUS
Status: DISCONTINUED | OUTPATIENT
Start: 2022-05-08 | End: 2022-05-08 | Stop reason: HOSPADM

## 2022-05-08 RX ORDER — HYDROXYZINE HYDROCHLORIDE 25 MG/1
25 TABLET, FILM COATED ORAL 4 TIMES DAILY PRN
Status: DISCONTINUED | OUTPATIENT
Start: 2022-05-08 | End: 2022-05-11 | Stop reason: HOSPADM

## 2022-05-08 RX ORDER — BUPIVACAINE HYDROCHLORIDE 2.5 MG/ML
INJECTION, SOLUTION EPIDURAL; INFILTRATION; INTRACAUDAL
Status: DISCONTINUED | OUTPATIENT
Start: 2022-05-08 | End: 2022-05-08

## 2022-05-08 RX ORDER — AMOXICILLIN 250 MG
1 CAPSULE ORAL 2 TIMES DAILY
Status: DISCONTINUED | OUTPATIENT
Start: 2022-05-08 | End: 2022-05-11 | Stop reason: HOSPADM

## 2022-05-08 RX ORDER — FENTANYL CITRATE-0.9 % NACL/PF 10 MCG/ML
PLASTIC BAG, INJECTION (ML) INTRAVENOUS CONTINUOUS PRN
Status: DISCONTINUED | OUTPATIENT
Start: 2022-05-08 | End: 2022-05-08

## 2022-05-08 RX ORDER — ACETAMINOPHEN 325 MG/1
975 TABLET ORAL EVERY 6 HOURS
Status: DISCONTINUED | OUTPATIENT
Start: 2022-05-08 | End: 2022-05-11 | Stop reason: HOSPADM

## 2022-05-08 RX ORDER — KETOROLAC TROMETHAMINE 30 MG/ML
INJECTION, SOLUTION INTRAMUSCULAR; INTRAVENOUS PRN
Status: DISCONTINUED | OUTPATIENT
Start: 2022-05-08 | End: 2022-05-08

## 2022-05-08 RX ORDER — CEFAZOLIN SODIUM 2 G/100ML
2 INJECTION, SOLUTION INTRAVENOUS SEE ADMIN INSTRUCTIONS
Status: DISCONTINUED | OUTPATIENT
Start: 2022-05-08 | End: 2022-05-08 | Stop reason: HOSPADM

## 2022-05-08 RX ORDER — MISOPROSTOL 200 UG/1
400 TABLET ORAL
Status: DISCONTINUED | OUTPATIENT
Start: 2022-05-08 | End: 2022-05-08 | Stop reason: HOSPADM

## 2022-05-08 RX ORDER — PROCHLORPERAZINE 25 MG
25 SUPPOSITORY, RECTAL RECTAL EVERY 12 HOURS PRN
Status: DISCONTINUED | OUTPATIENT
Start: 2022-05-08 | End: 2022-05-11 | Stop reason: HOSPADM

## 2022-05-08 RX ORDER — MISOPROSTOL 200 UG/1
400 TABLET ORAL
Status: DISCONTINUED | OUTPATIENT
Start: 2022-05-08 | End: 2022-05-11 | Stop reason: HOSPADM

## 2022-05-08 RX ORDER — HYDROCORTISONE 2.5 %
CREAM (GRAM) TOPICAL 3 TIMES DAILY PRN
Status: DISCONTINUED | OUTPATIENT
Start: 2022-05-08 | End: 2022-05-11 | Stop reason: HOSPADM

## 2022-05-08 RX ORDER — OXYTOCIN/0.9 % SODIUM CHLORIDE 30/500 ML
100-340 PLASTIC BAG, INJECTION (ML) INTRAVENOUS CONTINUOUS PRN
Status: DISCONTINUED | OUTPATIENT
Start: 2022-05-08 | End: 2022-05-08

## 2022-05-08 RX ORDER — IBUPROFEN 800 MG/1
800 TABLET, FILM COATED ORAL EVERY 6 HOURS
Status: DISCONTINUED | OUTPATIENT
Start: 2022-05-09 | End: 2022-05-11 | Stop reason: HOSPADM

## 2022-05-08 RX ORDER — MISOPROSTOL 200 UG/1
800 TABLET ORAL
Status: DISCONTINUED | OUTPATIENT
Start: 2022-05-08 | End: 2022-05-11 | Stop reason: HOSPADM

## 2022-05-08 RX ORDER — METHYLERGONOVINE MALEATE 0.2 MG/ML
200 INJECTION INTRAVENOUS
Status: DISCONTINUED | OUTPATIENT
Start: 2022-05-08 | End: 2022-05-11 | Stop reason: HOSPADM

## 2022-05-08 RX ORDER — SODIUM CHLORIDE, SODIUM LACTATE, POTASSIUM CHLORIDE, CALCIUM CHLORIDE 600; 310; 30; 20 MG/100ML; MG/100ML; MG/100ML; MG/100ML
INJECTION, SOLUTION INTRAVENOUS CONTINUOUS PRN
Status: DISCONTINUED | OUTPATIENT
Start: 2022-05-08 | End: 2022-05-08

## 2022-05-08 RX ORDER — MODIFIED LANOLIN
OINTMENT (GRAM) TOPICAL
Status: DISCONTINUED | OUTPATIENT
Start: 2022-05-08 | End: 2022-05-11 | Stop reason: HOSPADM

## 2022-05-08 RX ORDER — OXYTOCIN/0.9 % SODIUM CHLORIDE 30/500 ML
340 PLASTIC BAG, INJECTION (ML) INTRAVENOUS CONTINUOUS PRN
Status: DISCONTINUED | OUTPATIENT
Start: 2022-05-08 | End: 2022-05-11 | Stop reason: HOSPADM

## 2022-05-08 RX ORDER — CEFAZOLIN SODIUM 2 G/100ML
2 INJECTION, SOLUTION INTRAVENOUS
Status: COMPLETED | OUTPATIENT
Start: 2022-05-08 | End: 2022-05-08

## 2022-05-08 RX ORDER — KETOROLAC TROMETHAMINE 30 MG/ML
30 INJECTION, SOLUTION INTRAMUSCULAR; INTRAVENOUS EVERY 6 HOURS
Status: DISCONTINUED | OUTPATIENT
Start: 2022-05-08 | End: 2022-05-08

## 2022-05-08 RX ORDER — BISACODYL 10 MG
10 SUPPOSITORY, RECTAL RECTAL DAILY PRN
Status: DISCONTINUED | OUTPATIENT
Start: 2022-05-10 | End: 2022-05-11 | Stop reason: HOSPADM

## 2022-05-08 RX ORDER — NALBUPHINE HYDROCHLORIDE 20 MG/ML
2.5-5 INJECTION, SOLUTION INTRAMUSCULAR; INTRAVENOUS; SUBCUTANEOUS EVERY 6 HOURS PRN
Status: DISCONTINUED | OUTPATIENT
Start: 2022-05-08 | End: 2022-05-08

## 2022-05-08 RX ORDER — OXYTOCIN 10 [USP'U]/ML
10 INJECTION, SOLUTION INTRAMUSCULAR; INTRAVENOUS
Status: DISCONTINUED | OUTPATIENT
Start: 2022-05-08 | End: 2022-05-08 | Stop reason: HOSPADM

## 2022-05-08 RX ORDER — ONDANSETRON 2 MG/ML
4 INJECTION INTRAMUSCULAR; INTRAVENOUS EVERY 6 HOURS PRN
Status: DISCONTINUED | OUTPATIENT
Start: 2022-05-08 | End: 2022-05-11 | Stop reason: HOSPADM

## 2022-05-08 RX ORDER — OXYTOCIN 10 [USP'U]/ML
10 INJECTION, SOLUTION INTRAMUSCULAR; INTRAVENOUS
Status: DISCONTINUED | OUTPATIENT
Start: 2022-05-08 | End: 2022-05-08

## 2022-05-08 RX ORDER — BUPIVACAINE HYDROCHLORIDE 7.5 MG/ML
INJECTION, SOLUTION INTRASPINAL
Status: COMPLETED | OUTPATIENT
Start: 2022-05-08 | End: 2022-05-08

## 2022-05-08 RX ADMIN — KETOROLAC TROMETHAMINE 30 MG: 30 INJECTION, SOLUTION INTRAMUSCULAR at 18:07

## 2022-05-08 RX ADMIN — SIMETHICONE 80 MG: 80 TABLET, CHEWABLE ORAL at 23:46

## 2022-05-08 RX ADMIN — METHYLERGONOVINE MALEATE 200 MCG: 0.2 INJECTION INTRAVENOUS at 10:39

## 2022-05-08 RX ADMIN — SENNOSIDES AND DOCUSATE SODIUM 1 TABLET: 50; 8.6 TABLET ORAL at 20:51

## 2022-05-08 RX ADMIN — BUPIVACAINE HYDROCHLORIDE 20 ML: 2.5 INJECTION, SOLUTION EPIDURAL; INFILTRATION; INTRACAUDAL at 11:22

## 2022-05-08 RX ADMIN — BUPIVACAINE 20 ML: 13.3 INJECTION, SUSPENSION, LIPOSOMAL INFILTRATION at 11:22

## 2022-05-08 RX ADMIN — CEFAZOLIN SODIUM 2 G: 2 INJECTION, SOLUTION INTRAVENOUS at 09:41

## 2022-05-08 RX ADMIN — OXYCODONE 5 MG: 5 TABLET ORAL at 22:34

## 2022-05-08 RX ADMIN — MORPHINE SULFATE 0.15 MG: 1 INJECTION EPIDURAL; INTRATHECAL; INTRAVENOUS at 10:00

## 2022-05-08 RX ADMIN — AZITHROMYCIN MONOHYDRATE 500 MG: 500 INJECTION, POWDER, LYOPHILIZED, FOR SOLUTION INTRAVENOUS at 09:10

## 2022-05-08 RX ADMIN — Medication 100 ML/HR: at 12:35

## 2022-05-08 RX ADMIN — ACETAMINOPHEN 975 MG: 325 TABLET ORAL at 09:08

## 2022-05-08 RX ADMIN — OXYCODONE 5 MG: 5 TABLET ORAL at 18:04

## 2022-05-08 RX ADMIN — KETOROLAC TROMETHAMINE 15 MG: 30 INJECTION, SOLUTION INTRAMUSCULAR at 11:06

## 2022-05-08 RX ADMIN — ACETAMINOPHEN 975 MG: 325 TABLET ORAL at 22:34

## 2022-05-08 RX ADMIN — ONDANSETRON 4 MG: 2 INJECTION INTRAMUSCULAR; INTRAVENOUS at 10:51

## 2022-05-08 RX ADMIN — SODIUM CHLORIDE, POTASSIUM CHLORIDE, SODIUM LACTATE AND CALCIUM CHLORIDE: 600; 310; 30; 20 INJECTION, SOLUTION INTRAVENOUS at 10:50

## 2022-05-08 RX ADMIN — SODIUM CHLORIDE, POTASSIUM CHLORIDE, SODIUM LACTATE AND CALCIUM CHLORIDE: 600; 310; 30; 20 INJECTION, SOLUTION INTRAVENOUS at 08:26

## 2022-05-08 RX ADMIN — SODIUM CHLORIDE, POTASSIUM CHLORIDE, SODIUM LACTATE AND CALCIUM CHLORIDE: 600; 310; 30; 20 INJECTION, SOLUTION INTRAVENOUS at 09:41

## 2022-05-08 RX ADMIN — OXYTOCIN-SODIUM CHLORIDE 0.9% IV SOLN 30 UNIT/500ML 300 ML/HR: 30-0.9/5 SOLUTION at 10:33

## 2022-05-08 RX ADMIN — OXYCODONE 5 MG: 5 TABLET ORAL at 13:20

## 2022-05-08 RX ADMIN — ACETAMINOPHEN 975 MG: 325 TABLET ORAL at 15:33

## 2022-05-08 RX ADMIN — BUPIVACAINE HYDROCHLORIDE IN DEXTROSE 1.6 ML: 7.5 INJECTION, SOLUTION SUBARACHNOID at 10:00

## 2022-05-08 RX ADMIN — SODIUM CITRATE AND CITRIC ACID MONOHYDRATE 30 ML: 500; 334 SOLUTION ORAL at 09:08

## 2022-05-08 RX ADMIN — Medication 100 MCG/MIN: at 10:00

## 2022-05-08 RX ADMIN — FENTANYL CITRATE 10 MCG: 50 INJECTION, SOLUTION INTRAMUSCULAR; INTRAVENOUS at 10:00

## 2022-05-08 NOTE — ANESTHESIA PROCEDURE NOTES
Intrathecal injection Procedure Note    Pre-Procedure   Staff -        Anesthesiologist:  Manny Santos MD       Resident/Fellow: Corwin Forte MD       Performed By: resident       Location: OB       Procedure Start/Stop Times: 5/8/2022 10:00 AM and 5/8/2022 10:26 AM       Pre-Anesthestic Checklist: patient identified, IV checked, risks and benefits discussed, informed consent, monitors and equipment checked, pre-op evaluation, at physician/surgeon's request and post-op pain management  Timeout:       Correct Patient: Yes        Correct Procedure: Yes        Correct Site: Yes        Correct Position: Yes   Procedure Documentation  Procedure: intrathecal injection       Patient Position: sitting       Patient Prep/Sterile Barriers: sterile gloves, mask, patient draped       Skin prep: Chloraprep       Insertion Site: L3-4. (midline approach).       Needle Gauge: 25.        Needle Length (Inches): 3.5        Spinal Needle Type: Pencan       Introducer used       Introducer: 20 G       # of attempts: 1 and  # of redirects:  1    Assessment/Narrative         Paresthesias: No and Resolved.       Sensory Level: T4       CSF fluid: blood tinged.    Medication(s) Administered   0.75% Hyperbaric Bupivacaine (Intrathecal) - Intrathecal   1.6 mL - 5/8/2022 10:00:00 AM  Fentanyl PF (Intrathecal) - Intrathecal   10 mcg - 5/8/2022 10:00:00 AM  Morphine PF 1 mg/mL (Intrathecal) - Intrathecal   0.15 mg - 5/8/2022 10:00:00 AM  Medication Administration Time: 5/8/2022 10:00 AM

## 2022-05-08 NOTE — OP NOTE
[facility]   OPERATIVE NOTE:  SECTION     Surgery Date:  May 8, 2022   Surgeon(s): Celeste Woo MD  Assistants:  Tamia Enciso MD, [facility], PGY-3, Douglas Whiteside DO, MS, PGY-1    Preoperative Diagnoses:  -  at 39w1d  - History of  section x 1  - Fetal SVT/PAC  - Anxiety   - Anemia     Postoperative diagnoses:  -  at 39w1d, now delivered    Procedure performed:  Repeat low segment transverse  section via Pfannenstiel skin incision with double  layer uterine closure    Anesthesia:  Spinal with duramorph  QBL: 726 mL  Fluid replacement: 1600 mL crystalloid.   UOP: 100 mL  Specimens: Placenta (not sent), cord gases  Complications: None apparent       Operative findings:   -Single, vigorous male infant at 1033 hours on 2022. Apgars of 9 and 9 at one and five minutes. Birth weight: 4240 g.  Fetal presentation: Vertex. Amniotic fluid: Clear.    -Cord gases pending  -Placenta intact with 3 vessel cord.     -Normal appearing uterus, fallopian tubes, ovaries.   -Bladder adherent to lower uterine segment, bladder flap easily created.   -Mild recto-fascial adhesions.Moderate intraabdominal adhesions. No abdominal wall adhesions.   -Good uterine tone.     Indication: Maite Gates is a 26 year old,  by LMP, who was admitted at 39w1d following spontaneous rupture of membranes at 0600 this morning. Patient had already been counseled and elected to proceed with repeat  section.  The risks, benefits, and alternatives of  delivery were explained and informed consent was obtained.    Procedure details:  After obtaining informed consent, the patient was taken to the operating room. An appropriate patient safety Time Out was conducted.  She received 2g of ancef prior to the skin incision and 500mg of azithromycin intraoperatively. Spinal epidural with duramorph was placed.  She was placed in the dorsal supine position with a  leftward tilt and prepped and draped in the usual sterile fashion.     Following test of adequate spinal anesthesia, the abdomen was entered through a low transverse skin incision through her previous scar. The skin incision was made sharply and carried through the subcutaneous tissue to the fascia.  Fascia was incised in the midline and extended laterally with the Higgins scissors.  The superior margin of the fascial incision was grasped with Kocher clamps and dissected from the underlying muscle with sharp and blunt dissecton, which was then repeated at the lower margin of the fascial incision.  The muscle was  in the midline.     The peritoneum was entered bluntly and the opening extended by digital dissection with care to avoid the bladder. A bladder blade was placed. The vesicouterine peritoneum was entered sharply with Metzenbaum scissors and incision extended laterally. The bladder flap was created digitally and the bladder blade replaced. The lower segment of the uterus was opened sharply in a transverse fashion and extended with digital pressure. The infant's head was noted to be in the vertex position. It was elevated to the level of the hysterotomy and was delivered atraumatically, shoulders delivered easily thereafter. The cord was doubly clamped after 60 seconds and cut and the infant was handed off to the waiting nursery staff. A segment of the cord was cut and set aside for cord gases if needed.     The placenta was manually expressed.  The uterus was exteriorized from the abdomen and cleared of all clots and debris.  The uterus was massaged and was noted to be firm.  Pitocin was given through the running IV.  With vigorous massage as well as administration of pitocin, uterine tone was not yet optimal, and patient was given methergine. Good uterine tone was achieved shortly thereafter. The hysterotomy was repaired with 0-vicryl suture in a running locked fashion. A 2nd layer of 0-Monocryl was  used to imbricate the incision and good hemostasis was achieved. The bladder flap was inspected and found to be hemostatic.  The posterior cul-de-sac was suctioned and the uterus was returned to the abdomen.     The bilateral pericolic gutters were suctioned.  The hysterotomy was again inspected and found to be hemostatic.  The abdominal wall was examined and also found to be hemostatic. The fascia was closed with a running suture of 0-Vicryl.  Subcutaneous tissue was irrigated. Areas that were not hemostatic were controlled with cautery. The subcutaneous tissue was more than 3 cm in depth and was reapproximated with 3-0 Vicryl for cosmesis. The skin was closed with 4-0 Monocryl. Patient was given toradol. The patient tolerated the procedure well and was taken to the recovery room in stable condition. All sponge, needle and instrument counts were correct x2.    Dr. Woo was scrubbed and present for the entire procedure.     Douglas Whiteside DO, MS  Obstetrics, Gynecology & Women's Health   Resident, PGY-1  05/08/2022 12:03 PM

## 2022-05-08 NOTE — PLAN OF CARE
Data: Maite Gates transferred to [id number] via wheelchair at 1510. Baby transferred via parent's arms.  Action: Receiving unit notified of transfer: Yes. Patient and family notified of room change. Report given to ANAI Patel RN at 1515. Belongings sent to receiving unit. Accompanied by Registered Nurse. Oriented patient to surroundings. Call light within reach. ID bands double-checked with receiving RN.  Response: Patient tolerated transfer and is stable.

## 2022-05-08 NOTE — PROGRESS NOTES
Patient arrived to [facility] unit via zoom cart at 1510,with belongings, accompanied by spouse/ significant other, with infant in arms. Received report from Brie DURAN RN and checked bands. Unit and room orientation started. Call light given; no concerns present at this time. Continue with plan of care.

## 2022-05-08 NOTE — PROVIDER NOTIFICATION
05/08/22 0751   Provider Notification   Provider Name/Title Dr. Whiteside   Method of Notification Phone   Notification Reason Patient Arrived   D: patient arrived to labor and delivery for repeat C/S. SROM this morning at 0600 clear fluid. Occasional contractions started after SROM. Admission completed, orders released and will get patient prep for repeat C/S. P: Continue to monitor.

## 2022-05-08 NOTE — OR NURSING
OR to PACU Transfer Note  Data: Pt to OB PACU at 1155 via cart. PIV infusing NS with 30 mu/min. without complications, grove with clear urine to gravity, temp 97.8, vsWNL, pt does not complain of pain and/or nausea.   Interventions: IV to pump, monitors and alarms on, SCD on.  Response: stable.  Plan: Patient instructed to notify RN for pain or nausea, routine post op cares, initiate breastfeeding/pumping as soon as patient/infant able.

## 2022-05-08 NOTE — ANESTHESIA CARE TRANSFER NOTE
Patient: Maite Gates    Procedure: Procedure(s):   SECTION       Diagnosis: Fetal arrhythmia before the onset of labor [P03.810]  History of  section [Z98.891]  Polyhydramnios affecting pregnancy in third trimester [O40.3XX0]  Diagnosis Additional Information: No value filed.    Anesthesia Type:   Spinal     Note:    Oropharynx: oropharynx clear of all foreign objects and spontaneously breathing  Level of Consciousness: awake  Oxygen Supplementation: room air    Independent Airway: airway patency satisfactory and stable  Dentition: dentition unchanged  Vital Signs Stable: post-procedure vital signs reviewed and stable  Report to RN Given: handoff report given  Patient transferred to: PACU    Handoff Report: Identifed the Patient, Identified the Reponsible Provider, Reviewed the pertinent medical history, Discussed the surgical course, Reviewed Intra-OP anesthesia mangement and issues during anesthesia, Set expectations for post-procedure period and Allowed opportunity for questions and acknowledgement of understanding      Vitals:  Vitals Value Taken Time   /61 05/ 1145   Temp     Pulse     Resp     SpO2         Electronically Signed By: Corwin Forte MD  May 8, 2022  11:56 AM

## 2022-05-08 NOTE — BRIEF OP NOTE
[facility]    Brief Operative Note:       Surgery Date:  May 8, 2022   Surgeon(s): Celeste Woo MD  Assistants:    Monalisa Whiteside MD - PGY1  Tamia Enciso MD, [facility] [place] [facility], PGY-3    Preoperative Diagnoses:  -  at 39w1d   - History of  section x 1  - Fetal SVT/PAC  - Anxiety   - Anemia      Postoperative diagnoses:  -  at 39w1d, now delivered     Procedure performed:  Repeat low segment transverse  section via Pfannenstiel skin incision with double layer uterine closure     Anesthesia:  Spinal with duramorph  QBL: 626 mL  Fluid replacement:  mL crystalloid.   UOP: 100 mL clear urine   Specimens: Placenta (not sent), cord gases (not sent)  Complications: None apparent        Operative findings:   -Single, liveborn male infant at 1033 hours on 2022. Apgars of 9 and 9 at one and five minutes. Birth weight: 4240 g.  Fetal presentation: Vertex. Amniotic fluid: Clear.    -Placenta intact with 3 vessel cord.     -Normal appearing uterus, fallopian tubes, ovaries.   -Bladder adherent to lower uterine segment, bladder flap easily created.   -Mild recto-fascial adhesions.No intraabdominal adhesions.   -Good uterine tone.      Tamia Enciso MD

## 2022-05-08 NOTE — ANESTHESIA POSTPROCEDURE EVALUATION
Patient: Maite Gates    Procedure: Procedure(s):   SECTION       Anesthesia Type:  Spinal    Note:  Disposition: Inpatient   Postop Pain Control: Uneventful            Sign Out: Well controlled pain   PONV: No   Neuro/Psych: Uneventful            Sign Out: Acceptable/Baseline neuro status   Airway/Respiratory: Uneventful            Sign Out: Acceptable/Baseline resp. status   CV/Hemodynamics: Uneventful            Sign Out: Acceptable CV status; No obvious hypovolemia; No obvious fluid overload   Other NRE: NONE   DID A NON-ROUTINE EVENT OCCUR? No           Last vitals:  Vitals Value Taken Time   /62 22 1245   Temp     Pulse 84 22 1258   Resp     SpO2 97 % 22 1258   Vitals shown include unvalidated device data.    Electronically Signed By: Manny Santos MD  May 8, 2022  3:50 PM

## 2022-05-08 NOTE — H&P
History and Physical     Maite Gates MRN# [id number]   YOB: 1995 Age: 26 year old      Date of Admission: 2022    Primary care provider: No Ref-Primary, Physician      Assessment and Plan:       26 year old  at 39w1d by LMP, here following SROM this morning at 0600. Pregnancy is notable for fetal SVT and PACs, Hx of CSx1.     # Fetal SVT/PACs  - plan will be for repeat CS and NICU at delivery,   - baby will received EKG and echo following delivery    # SROM  # Repeat  Section  Indicated due to history of CS x1 and SROM at home. Prior (s) for failure to progress. Patient was scheduled for 22, but will plan to do today in setting of SROM.  - Labs: CBC, T&S, RPR. Labs pending on admission  - Placenta: posterior  - Anesthesia: Spinal   - 2g Ancef and 500mg Azithromycin   - Diet: NPO  - PPx: SCDs,   - Consent: Discussed risks and benefits of procedure, including but not limited to bleeding, infection, injury to surrounding organs, injury to infant, and the potential need for another surgery should some injury go unrecognized or patient were to have continued bleeding. Patient had time to ask questions and expressed understanding of procedure and associated risks. Agreed to blood transfusion if necessary . Consent signed electronically.     # Anemia  - Hgb 10.0. will monitor blood loss and hgb closely and treat as indicated     # Anxiety  - mood currently stable. Will plan to continue PTA Vistaril PRN for anxiety.  - patient does not want to restarted Sertraline following delivery. Will re-address at her 6 week post partum visit  - will order social work consultation following delivery.     # PNC  - Rh positive, Rubella immune, GCT passed, GBS unknown  - Other prenatal labs wnl  - Feeding: planning on breast feeding     # FWB:   FHT overall reactive, reassuring;   - Continuous Fetal Monitoring  - NICU for delivery   - Intrauterine resuscitative measures prn      Patient  discussed with Dr. Woo               HPI:     Maite Gates is a 26 year old  at 39w1d by LMP who presents today to L&D following SROM at home this morning at 0600. Patient was scheduled for repeat CS tomorrow. Reports copious amounts of clear fluid shortly after waking up this morning. Denies any vaginal bleeding. Reports good fetal movement. Has otherwise been feeling well. Does report a mild headache, but this has intermittently resolved without intervention. Also has a history of anxiety which she was previously on Sertraline for, but has most recently been using Vistaril as needed. Last used twice (50mg total) yesterday. She has a history of anemia and has gotten 2 iron infusions. Has been NPO since 2200 last night. She denies fever, chills, SOB, chest pain, palpitations, N/V, LE swelling/tenderness.  No concerns for vision changes, RUQ or epigastric pain      Her previous  delivery () was notable for conversion to GETA given STAT nature of failure to progress CS decision. Patient looking forward to delivery this time around. Denies history of postpartum hemorrhage, shoulder dystocia, pre-eclampsia. No history of asthma or high blood pressure.    Pregnancy notable for:   - Fetal SVT/PAC  - Hx of CS x1  - Anxiety (PTA vistaril)  - Anemia (s/p 2/3 Iron infusions      OB History:    OB History    Para Term  AB Living   3 1 1 0 1 1   SAB IAB Ectopic Multiple Live Births   1 0 0 0 1      # Outcome Date GA Lbr Aman/2nd Weight Sex Delivery Anes PTL Lv   3 Current            2 SAB  5w0d          1 Term  41w0d  4.309 kg (9 lb 8 oz) M CS-Unspec   VERONICA        Prenatal Lab Results:  Lab Results   Component Value Date    ABO O 2019    RH Pos 2019    AS Negative 2022    CHPCRT Negative 2019    GCPCRT Negative 2019    HGB 10.0 (L) 2022       GBS Status: Collected today, pending               Past Medical History:     Past Medical History:    Diagnosis Date     NO ACTIVE PROBLEMS              Past Surgical History:     Past Surgical History:   Procedure Laterality Date      SECTION       NECK SURGERY       TONSILLECTOMY & ADENOIDECTOMY               Social History:     Social History     Tobacco Use     Smoking status: Never Smoker     Smokeless tobacco: Never Used   Substance Use Topics     Alcohol use: Yes             Family History:     Family History   Problem Relation Age of Onset     Parkinsonism Paternal Grandfather      Alzheimer Disease Paternal Grandfather              Immunizations:     Immunization History   Administered Date(s) Administered     COVID-19,PF,Pfizer 12+ Yrs ( and After) 2022, 2022     DTAP (<7y) 1994, 1995, 1995, 1996, 1999     HPV Quadrivalent 2008     HPV9 2016     Hep B, Peds or Adolescent 1994, 1994, 1995     Influenza (IIV3) PF 10/29/2008     MMR 1996, 1999     Meningococcal (Menactra ) 2005     Meningococcal (Menveo ) 2013     Meningococcal,unspecified 2005     OPV, unspecified 1994, 1995, 1996     Pedvax-hib 1994, 1995, 1995, 1996     Poliovirus, inactivated (IPV) 1999     TD (ADULT, 7+) 2005     TDAP Vaccine (Adacel) 2013, 2015     Tdap (Adacel,Boostrix) 2022     Varicella 2006, 2008            Allergies:     Allergies   Allergen Reactions     Cats Itching             Medications:     Medications Prior to Admission   Medication Sig Dispense Refill Last Dose     hydrOXYzine (VISTARIL) 25 MG capsule    2022 at Unknown time     Prenatal Vit-Fe Fumarate-FA (PNV PRENATAL PLUS MULTIVITAMIN) 27-1 MG TABS per tablet Take 1 tablet by mouth daily   2022 at Unknown time     calcium carbonate (TUMS) 500 MG chewable tablet Take 1 chew tab by mouth 2 times daily        omeprazole (PRILOSEC OTC) 20 MG EC tablet Take 1 tablet (20  mg) by mouth daily (Patient not taking: Reported on 4/27/2022) 90 tablet 1      ranitidine (ZANTAC) 150 MG tablet Take 1 tablet (150 mg) by mouth 2 times daily (Patient not taking: Reported on 4/27/2022) 60 tablet 1      sertraline (ZOLOFT) 50 MG tablet Take 50 mg by mouth daily (Patient not taking: Reported on 4/27/2022)                Review of Systems & Physical Exam:     The Review of Systems is negative other than noted in the HPI      /65 (BP Location: Left arm)   Temp 97.8  F (36.6  C) (Oral)   Resp 20   LMP 08/07/2021   SpO2 98%   Gen: Laying in bed. Partner in room. No acute distress.  CV: RRR, nl S1/S2, no murmurs/clicks/gallops  Lungs: CTAB, non-labored breathing  Abd: Gravid, non-tender, non-distended  Ext: trace peripheral extremity edema;    Estimated Fetal Weight: 7# by leopolds    FHT:  Monitoring External  FHT: Baseline 140 bpm; moderate variability; + accels present; no decelerations  TOCO 1 contractions in 10 minutes         Data:     Lab on 05/06/2022   Component Date Value Ref Range Status     WBC Count 05/06/2022 12.7 (A) 4.0 - 11.0 10e3/uL Final     RBC Count 05/06/2022 3.62 (A) 3.80 - 5.20 10e6/uL Final     Hemoglobin 05/06/2022 10.0 (A) 11.7 - 15.7 g/dL Final     Hematocrit 05/06/2022 31.3 (A) 35.0 - 47.0 % Final     MCV 05/06/2022 87  78 - 100 fL Final     MCH 05/06/2022 27.6  26.5 - 33.0 pg Final     MCHC 05/06/2022 31.9  31.5 - 36.5 g/dL Final     RDW 05/06/2022 16.0 (A) 10.0 - 15.0 % Final     Platelet Count 05/06/2022 174  150 - 450 10e3/uL Final     ABO/RH(D) 05/06/2022 O POS   Final     Antibody Screen 05/06/2022 Negative  Negative Final     SPECIMEN EXPIRATION DATE 05/06/2022 [id number]   Final         Douglas Whiteside DO, MS  Obstetrics, Gynecology & Women's Health   Resident, PGY-1  05/08/2022 9:41 AM

## 2022-05-08 NOTE — DISCHARGE SUMMARY
[facility] Discharge Summary      Patient: Maite Gates   YOB: 1995   MRN# [id number]                Date of Admission:  2022  Date of Discharge::  22  Admitting Physician:  Celeste Woo MD  Discharge Physician:  Zaida Gilman MD             Admission Diagnoses:   - Intrauterine pregnancy at 39w1d  - Spontaneous rupture of membranes  - Anemia  - Anxiety          Discharge Diagnosis:     - Same, delivered   - post op urinary retention, resolved          Procedures:     Procedure(s): Repeat low transverse  section with double layer closure via Pfannenstiel  skin incision  Spinal anesthesia                Medications Prior to Admission:     Medications Prior to Admission   Medication Sig Dispense Refill Last Dose     hydrOXYzine (VISTARIL) 25 MG capsule    2022 at Unknown time     Prenatal Vit-Fe Fumarate-FA (PNV PRENATAL PLUS MULTIVITAMIN) 27-1 MG TABS per tablet Take 1 tablet by mouth daily   2022 at Unknown time     calcium carbonate (TUMS) 500 MG chewable tablet Take 1 chew tab by mouth 2 times daily        omeprazole (PRILOSEC OTC) 20 MG EC tablet Take 1 tablet (20 mg) by mouth daily (Patient not taking: Reported on 2022) 90 tablet 1      ranitidine (ZANTAC) 150 MG tablet Take 1 tablet (150 mg) by mouth 2 times daily (Patient not taking: Reported on 2022) 60 tablet 1      sertraline (ZOLOFT) 50 MG tablet Take 50 mg by mouth daily (Patient not taking: Reported on 2022)                Discharge Medications:        Review of your medicines      START taking      Dose / Directions   acetaminophen 325 MG tablet  Commonly known as: TYLENOL  Used for: S/P  section      Dose: 650 mg  Take 2 tablets (650 mg) by mouth every 6 hours as needed for mild pain Start after Delivery.  Quantity: 100 tablet  Refills: 0     ferrous sulfate 325 (65 Fe) MG tablet  Commonly known as: FEROSUL  Used for: S/P   section      Dose: 325 mg  Take 1 tablet (325 mg) by mouth daily (with breakfast)  Quantity: 90 tablet  Refills: 3     ibuprofen 600 MG tablet  Commonly known as: ADVIL/MOTRIN  Used for: S/P  section      Dose: 600 mg  Take 1 tablet (600 mg) by mouth every 6 hours as needed for moderate pain Start after delivery  Quantity: 60 tablet  Refills: 0     oxyCODONE 5 MG tablet  Commonly known as: ROXICODONE  Used for: Acute post-operative pain      Dose: 5 mg  Take 1 tablet (5 mg) by mouth every 6 hours as needed for pain  Quantity: 10 tablet  Refills: 0     senna-docusate 8.6-50 MG tablet  Commonly known as: SENOKOT-S/PERICOLACE  Used for: S/P  section      Dose: 1 tablet  Take 1 tablet by mouth daily Start after delivery.  Quantity: 100 tablet  Refills: 0        CONTINUE these medicines which have NOT CHANGED      Dose / Directions   calcium carbonate 500 MG chewable tablet  Commonly known as: TUMS      Dose: 1 chew tab  Take 1 chew tab by mouth 2 times daily  Refills: 0     hydrOXYzine 25 MG capsule  Commonly known as: VISTARIL      Refills: 0     omeprazole 20 MG EC tablet  Commonly known as: priLOSEC OTC  Used for: Gastroesophageal reflux disease, esophagitis presence not specified      Dose: 20 mg  Take 1 tablet (20 mg) by mouth daily  Quantity: 90 tablet  Refills: 1     PNV Prenatal Plus Multivitamin 27-1 MG Tabs per tablet      Dose: 1 tablet  Take 1 tablet by mouth daily  Refills: 0     ranitidine 150 MG tablet  Commonly known as: ZANTAC  Used for: Gastroesophageal reflux disease, esophagitis presence not specified      Dose: 150 mg  Take 1 tablet (150 mg) by mouth 2 times daily  Quantity: 60 tablet  Refills: 1     sertraline 50 MG tablet  Commonly known as: ZOLOFT      Dose: 50 mg  Take 50 mg by mouth daily  Refills: 0           Where to get your medicines      These medications were sent to [facility] [place], [place] - [address]  [address],  [facility] [address]    Phone: [phone]     acetaminophen 325 MG tablet    ferrous sulfate 325 (65 Fe) MG tablet    ibuprofen 600 MG tablet    oxyCODONE 5 MG tablet    senna-docusate 8.6-50 MG tablet               Consultations:   - Anesthesia  - Lactation          Brief Admission History:   Maite Gates is a 26 year old  at 39w1d by LMP who presents today to L&D following SROM at home this morning at 0600. Patient was scheduled for repeat CS tomorrow. Reports copious amounts of clear fluid shortly after waking up this morning. Denies any vaginal bleeding. Reports good fetal movement. Has otherwise been feeling well. Does report a mild headache, but this has intermittently resolved without intervention. Also has a history of anxiety which she was previously on Sertraline for, but has most recently been using Vistaril as needed. Last used twice (50mg total) yesterday. She has a history of anemia and has gotten 2 iron infusions. Has been NPO since 2200 last night.        Intraoperative course   Operative findings:   -Single, vigorous male infant at 1033 hours on 2022. Apgars of 9 and 9 at one and five minutes. Birth weight: 4240 g.  Fetal presentation: Vertex. Amniotic fluid: Clear.    -Cord gases pending  -Placenta intact with 3 vessel cord.     -Normal appearing uterus, fallopian tubes, ovaries.   -Bladder adherent to lower uterine segment, bladder flap easily created.   -Mild recto-fascial adhesions.Moderate intraabdominal adhesions. No abdominal wall adhesions.   -Good uterine tone.    -   See operative report for details.        Postpartum Course   The patient's hospital course was unremarkable.  She recovered as anticipated. She had post operative urinary retention requiring straight catheterization, and then resolved and she was then voiding spontaneously. She experienced no other post-operative complications. On discharge, her pain was well controlled. Vaginal bleeding is similar to  peak menstrual flow.  Voiding without difficulty.  Ambulating well and tolerating a normal diet.  No fever or significant wound drainage.  Breast and bottle feeding well.  Infant is stable. Bowel function resuming  She was discharged on post-partum day #3.    Post-partum hemoglobin:   Hemoglobin   Date Value Ref Range Status   05/09/2022 8.4 (L) 11.7 - 15.7 g/dL Final             Discharge Instructions and Follow-Up:     Discharge diet: Regular   Discharge activity: No lifting greater than 20 lbs, pushing, pulling, or other strenuous activity for 6 weeks. Pelvic rest for 6 weeks including no sexual intercourse, tampons, or douching. No driving until you can slam on the breaks without pain or while on narcotic pain medications.    Discharge follow-up: Follow up with primary OB for routine postpartum visit in 6 weeks   Wound care: Keep incision clean and dry           Discharge Disposition:     Discharged to home      Lucero Nicole MD  Obstetrics and Gynecology, PGY-3  05/11/22. 6:08 AM    [facility] staff:  Appreciate note by Dr. Nicole.  I have seen and examined the patient without the resident. I have reviewed, edited, and agree with the note.      Zaida Gilman MD, FACOG  5/11/2022  8:30 AM

## 2022-05-08 NOTE — PLAN OF CARE
Transfer to OR & C/S Delivery Note  Patient to OR at 0944 via walking.   Delivered viable Male via repeat  section by Dr. Woo.  To warmer, stimulated and dried by Nursery RN.  APGAR at 1 minute:  9 and APGAR at 5 minutes:    Brought to mother after bundling for bonding.

## 2022-05-08 NOTE — ANESTHESIA PROCEDURE NOTES
TAP Procedure Note    Pre-Procedure   Staff -        Anesthesiologist:  Manny Santos MD       Resident/Fellow: Corwin Forte MD       Performed By: resident       Location: OB       Procedure Start/Stop Times: 5/8/2022 11:30 AM and 5/8/2022 11:55 AM       Pre-Anesthestic Checklist: patient identified, IV checked, site marked, risks and benefits discussed, informed consent, monitors and equipment checked, pre-op evaluation, at physician/surgeon's request and post-op pain management  Timeout:       Correct Patient: Yes        Correct Procedure: Yes        Correct Site: Yes        Correct Position: Yes        Correct Laterality: Yes        Site Marked: Yes  Procedure Documentation  Procedure: TAP       Laterality: bilateral       Patient Position: supine       Patient Prep/Sterile Barriers: sterile gloves, mask       Skin prep: Chloraprep       Needle Type: short bevel       Needle Gauge: 21.        Needle Length (millimeters): 110        Ultrasound guided       1. Ultrasound was used to identify targeted nerve, plexus, vascular marker, or fascial plane and place a needle adjacent to it in real-time.       2. Ultrasound was used to visualize the spread of anesthetic in close proximity to the above referenced structure.       3. A permanent image is entered into the patient's record.       4. The visualized anatomic structures appeared normal.       5. There were no apparent abnormal pathologic findings.    Assessment/Narrative         The placement was negative for: blood aspirated, painful injection and site bleeding       Paresthesias: No.       Bolus given via needle. no blood aspirated via catheter.        Secured via.        Insertion/Infusion Method: Single Shot       Complications: none    Medication(s) Administered   Bupivacaine 0.25% PF (Infiltration) - Infiltration   20 mL - 5/8/2022 11:22:00 AM  Bupivacaine liposome (Exparel) 1.3% LA inj susp (Infiltration) - Infiltration   20 mL - 5/8/2022  11:22:00 AM  Medication Administration Time: 5/8/2022 11:30 AM

## 2022-05-08 NOTE — PLAN OF CARE
[id number]  Goal Outcome Evaluation:  Vital signs within normal limits. Postpartum checks within normal limits. Patient eating and drinking normally. Due to void psot grove removal, plan to bladder scan if unable to void by 1900. Adequate pain control.  Breastfeeding on cue, requires assist with deeper latch and positioning.Positive attachment behaviors observed with infant. Support person present and very supportive. Will continue to assess/assist as needed.

## 2022-05-08 NOTE — ANESTHESIA PREPROCEDURE EVALUATION
Anesthesia Pre-Procedure Evaluation    Patient: Maite Gates   MRN: [id number] : 1995        Procedure : Procedure(s):   SECTION          Past Medical History:   Diagnosis Date     NO ACTIVE PROBLEMS       Past Surgical History:   Procedure Laterality Date      SECTION       NECK SURGERY       TONSILLECTOMY & ADENOIDECTOMY        Allergies   Allergen Reactions     Cats Itching      Social History     Tobacco Use     Smoking status: Never Smoker     Smokeless tobacco: Never Used   Substance Use Topics     Alcohol use: Yes      Wt Readings from Last 1 Encounters:   22 100.7 kg (222 lb)        Anesthesia Evaluation   Pt has had prior anesthetic. Type: General and Regional.    No history of anesthetic complications       ROS/MED HX  ENT/Pulmonary:  - neg pulmonary ROS     Neurologic:  - neg neurologic ROS     Cardiovascular:  - neg cardiovascular ROS     METS/Exercise Tolerance: >4 METS    Hematologic:  - neg hematologic  ROS     Musculoskeletal:       GI/Hepatic:     (+) GERD,     Renal/Genitourinary:       Endo:     (+) Obesity,     Psychiatric/Substance Use:  - neg psychiatric ROS     Infectious Disease:       Malignancy:       Other:      (+) , previous ,         Physical Exam    Airway        Mallampati: III   TM distance: > 3 FB   Neck ROM: full   Mouth opening: > 3 cm    Respiratory Devices and Support         Dental  no notable dental history         Cardiovascular   cardiovascular exam normal          Pulmonary   pulmonary exam normal            Other findings: Patient presented with SROM 1 day before a scheduled repeat CS. Pregnancy c/b occurrence of SVT in the fetus.    OUTSIDE LABS:  CBC:   Lab Results   Component Value Date    WBC 12.7 (H) 2022    WBC 12.7 (H) 2022    HGB 10.0 (L) 2022    HGB 10.7 (L) 2022    HCT 31.3 (L) 2022    HCT 32.2 (L) 2022     2022     2022     BMP:   Lab Results   Component Value  Date     04/19/2022    POTASSIUM 3.4 04/19/2022    CHLORIDE 106 04/19/2022    CO2 21 04/19/2022    BUN 7 04/19/2022    CR 0.70 04/19/2022     (H) 04/19/2022     COAGS: No results found for: PTT, INR, FIBR  POC: No results found for: BGM, HCG, HCGS  HEPATIC:   Lab Results   Component Value Date    ALBUMIN 2.8 (L) 04/19/2022    PROTTOTAL 7.1 04/19/2022    ALT 16 04/19/2022    AST 16 04/19/2022    ALKPHOS 176 (H) 04/19/2022    BILITOTAL 0.4 04/19/2022     OTHER:   Lab Results   Component Value Date    CHIKA 9.8 04/19/2022    TSH 1.81 04/19/2022       Anesthesia Plan    ASA Status:  2, emergent       Anesthesia Type: Spinal.              Consents    Anesthesia Plan(s) and associated risks, benefits, and realistic alternatives discussed. Questions answered and patient/representative(s) expressed understanding.    - Discussed:     - Discussed with:  Patient, Spouse         Postoperative Care            Comments:                  Corwin Forte MD

## 2022-05-09 LAB
GP B STREP DNA SPEC QL NAA+PROBE: NEGATIVE
HGB BLD-MCNC: 8.4 G/DL (ref 11.7–15.7)
HOLD SPECIMEN: NORMAL

## 2022-05-09 PROCEDURE — 250N000013 HC RX MED GY IP 250 OP 250 PS 637: Performed by: OBSTETRICS & GYNECOLOGY

## 2022-05-09 PROCEDURE — 85018 HEMOGLOBIN: CPT | Performed by: OBSTETRICS & GYNECOLOGY

## 2022-05-09 PROCEDURE — 36415 COLL VENOUS BLD VENIPUNCTURE: CPT | Performed by: OBSTETRICS & GYNECOLOGY

## 2022-05-09 PROCEDURE — 120N000002 HC R&B MED SURG/OB UMMC

## 2022-05-09 RX ADMIN — OXYCODONE 5 MG: 5 TABLET ORAL at 11:34

## 2022-05-09 RX ADMIN — SIMETHICONE 80 MG: 80 TABLET, CHEWABLE ORAL at 17:19

## 2022-05-09 RX ADMIN — OXYCODONE 5 MG: 5 TABLET ORAL at 21:04

## 2022-05-09 RX ADMIN — HYDROXYZINE HYDROCHLORIDE 25 MG: 25 TABLET, FILM COATED ORAL at 02:43

## 2022-05-09 RX ADMIN — IBUPROFEN 800 MG: 800 TABLET, FILM COATED ORAL at 00:00

## 2022-05-09 RX ADMIN — HYDROXYZINE HYDROCHLORIDE 25 MG: 25 TABLET, FILM COATED ORAL at 07:08

## 2022-05-09 RX ADMIN — ACETAMINOPHEN 975 MG: 325 TABLET ORAL at 05:53

## 2022-05-09 RX ADMIN — ACETAMINOPHEN 975 MG: 325 TABLET ORAL at 17:15

## 2022-05-09 RX ADMIN — ACETAMINOPHEN 975 MG: 325 TABLET ORAL at 11:34

## 2022-05-09 RX ADMIN — OXYCODONE 5 MG: 5 TABLET ORAL at 02:21

## 2022-05-09 RX ADMIN — SIMETHICONE 80 MG: 80 TABLET, CHEWABLE ORAL at 06:05

## 2022-05-09 RX ADMIN — IBUPROFEN 800 MG: 800 TABLET, FILM COATED ORAL at 11:36

## 2022-05-09 RX ADMIN — OXYCODONE 5 MG: 5 TABLET ORAL at 15:34

## 2022-05-09 RX ADMIN — IBUPROFEN 800 MG: 800 TABLET, FILM COATED ORAL at 05:53

## 2022-05-09 RX ADMIN — OXYCODONE 5 MG: 5 TABLET ORAL at 05:53

## 2022-05-09 RX ADMIN — IBUPROFEN 800 MG: 800 TABLET, FILM COATED ORAL at 17:19

## 2022-05-09 RX ADMIN — SENNOSIDES AND DOCUSATE SODIUM 1 TABLET: 50; 8.6 TABLET ORAL at 08:35

## 2022-05-09 RX ADMIN — SENNOSIDES AND DOCUSATE SODIUM 1 TABLET: 50; 8.6 TABLET ORAL at 21:00

## 2022-05-09 NOTE — PROGRESS NOTES
Post Partum Progress Note  PPD#2    Subjective:  She is resting comfortably in bed this morning. She complains of mild abdominal pain. Pain is overall well controlled on current medication regimen. She is tolerating PO intake. Lochia present and light.  She is voiding without difficulty. She has passed flatus and has not had a BM. She is ambulating without dizziness or difficulty.  She denies headache, changes in vision, nausea/vomiting, chest pain, shortness of breath, RUQ pain, or worsening edema.  Plans to breast and bottle feed.    Objective:  Vitals:    22 0800 22 1500 22 2110 05/10/22 0111   BP: 108/75 110/81 114/73 109/74   BP Location:   Right arm Right arm   Patient Position:   Semi-Dorantes's Semi-Dorantes's   Cuff Size: Adult Regular Adult Regular Adult Regular Adult Regular   Pulse: 87 108 97 93   Resp: 18 18 17 18   Temp: 98.6  F (37  C) 98.4  F (36.9  C) 97.5  F (36.4  C) 98  F (36.7  C)   TempSrc:  Oral Oral Oral   SpO2: 98%          General: NAD, resting comfortably  CV: Regular rate, well perfused.   Pulm: Normal respiratory effort.  Abd: Soft, non-tender, non-distended. Fundus is firm and below the umbilicus.    Incision: Dressing in place. Clean, dry, intact  Ext: +2 lower extremity edema bilaterally. No calf tenderness.    Assessment/Plan:  Maite Gates is a 26 year old  female who is POD#2 s/p RLTCS. Meeting postoperative goals.     - Encourage routine post-operative goals including ambulation and incentive spirometry  - PNC: Rh positive. Rubella immune. No intervention indicated.  - Pain: controlled on oral medications  - Heme: Hgb 10.0>>8.4. Will discharge home with iron.  - GI: continue anti-emetics and stool softeners as needed.  - : Voiding spontaneously.  - Infant: Stable in room  - Feeding: Plans on breast and bottle feeding.  - BC: Declines    Anxiety  - Continue vistaril prn     Discharge to home later today versus tomorrow pending baby's discharge.      Lucero Nicole MD  Obstetrics and Gyncology, PGY-3  05/10/22. 6:22 AM.     /74 (BP Location: Right arm, Cuff Size: Adult Regular)   Pulse 94   Temp 98.6  F (37  C) (Oral)   Resp 18   LMP 08/07/2021   SpO2 98%   Breastfeeding Unknown   Hemoglobin   Date Value Ref Range Status   05/09/2022 8.4 (L) 11.7 - 15.7 g/dL Final     The patient was seen and examined by me separately from the team.  I have reviewed and agree with the above note.  She is doing overall well today-more sore than yesterday, got behind on pain medications yesterday and still catching up.  Would be open to discharge later today if baby is discharged-baby still needing blood sugar monitoring at this time.  Continue routine post op care.  Possible late discharge today if baby is able to go home.      Kaylee Herman MD, FACOG     LINDA Vinson

## 2022-05-09 NOTE — PROGRESS NOTES
Post Partum Progress Note  PPD#1    Subjective:  She is resting comfortably in bed this morning. She complains of mild abdominal pain. Pain is improving and well controlled on current medication regimen. She is tolerating PO intake. Lochia present and light.  She is voiding without difficulty although does note some persistent numbness. She has passed flatus and has not had a BM. She is ambulating without dizziness or difficulty.  She denies headache, changes in vision, nausea/vomiting, chest pain, shortness of breath, RUQ pain, or worsening edema.  Plans to breast and bottle feed.    Objective:  Vitals:    22 1700 22 1807 22 2220 22 0221   BP: 97/70 94/59 111/72 124/77   BP Location:   Right arm Right arm   Patient Position:   Semi-Dorantes's Semi-Dorantes's   Cuff Size:   Adult Regular Adult Regular   Pulse:  95 90    Resp: 18 20 18 20   Temp: 98.1  F (36.7  C) 97.8  F (36.6  C) 98.5  F (36.9  C) 97.7  F (36.5  C)   TempSrc: Oral Oral Oral Oral   SpO2: 97% 98%         General: NAD, resting comfortably  CV: Regular rate, well perfused.   Pulm: Normal respiratory effort.  Abd: Soft, non-tender, non-distended. Fundus is firm and below the umbilicus.    Incision: Dressing in place. Clean, dry, intact  Ext: +2 lower extremity edema bilaterally. No calf tenderness.    Assessment/Plan:  Maite Gates is a 26 year old  female who is POD#1 s/p RLTCS. Appropriate for postoperative day 1.     - Encourage routine post-operative goals including ambulation and incentive spirometry  - PNC: Rh positive. Rubella immune. No intervention indicated.  - Pain: controlled on oral medications  - Heme: Hgb 10.0>>AM Hgb pending.  If <10 will discharge home with iron.  - GI: continue anti-emetics and stool softeners as needed.  - : Voiding spontaneously.  - Infant: Stable in room  - Feeding: Plans on breast and bottle feeding.  - BC: Declines    Anxiety  - Continue vistaril prn     Discharge to home on  POD#2-3    Lucero Nicole MD  Obstetrics and Gyncology, PGY-3  May 9, 2022, 6:17 AM.Physician Attestation   I, Gilda Palma MD, personally examined and evaluated this patient.  I discussed the patient with the residen and care team, and agree with the assessment and plan of care as documented in the note of 5/09/22.      I personally reviewed vital signs, medications and labs.    Key findings: Doing well, POD 1.  Gilda Palma MD  Date of Service (when I saw the patient): 5/9/22

## 2022-05-09 NOTE — PLAN OF CARE
Goal Outcome Evaluation:  Data: VSS and postpartum checks WNL. Patient eating and drinking normally. Patient able to empty bladder independently and up ambulating. Patient performing self care and able to care for infant.Fundus at 1 cm below U and firm  without massage.  lochia scant and  no blood clots. Dressing clean,dry and intact.   Action: Patient taking Oxycodone, Tylenol and Ibuprofen for pain and pain tolerable. Encouraged patient to breast  feed every 2 - 3 hours and to monitor for cues to feed infant. Patient education done( education record).   Response: Patient participating in infant's care. Positive attachment with infant observed with infant.  Plan: Continue with the plan of cares.

## 2022-05-09 NOTE — PLAN OF CARE
VSS. Postpartum assessments WDL. Able to void x1, was straight cathed x 1 earlier last evening. Pain controlled with ibuprofen, tylenol and oxycodone. Ibuprofen started early due to IV occluded and removed. Positive attachment behaviors observed with infant.  Breast feeding with minimal assist. Mother using nipple shield. Supplementing baby with 5-10ml DBM per paced bottle feeding per mothers request. Encouraged and reviewed hand expression and discussed pumping. Patient states might pump later. Patient did have crying episode due to worrying about being able to void, and feeling exhausted. Patient able to void. Plan then made to skip one baby feeding and give donor milk to allow a couple of hours of rest. Patient currently sleeping. Will continue to provide support and education as needed. Continue present cares.

## 2022-05-10 PROCEDURE — 250N000013 HC RX MED GY IP 250 OP 250 PS 637: Performed by: OBSTETRICS & GYNECOLOGY

## 2022-05-10 PROCEDURE — 120N000002 HC R&B MED SURG/OB UMMC

## 2022-05-10 RX ORDER — ACETAMINOPHEN 325 MG/1
650 TABLET ORAL EVERY 6 HOURS PRN
Qty: 100 TABLET | Refills: 0 | Status: SHIPPED | OUTPATIENT
Start: 2022-05-10

## 2022-05-10 RX ORDER — OXYCODONE HYDROCHLORIDE 5 MG/1
5 TABLET ORAL EVERY 6 HOURS PRN
Qty: 10 TABLET | Refills: 0 | Status: SHIPPED | OUTPATIENT
Start: 2022-05-10 | End: 2022-05-13

## 2022-05-10 RX ORDER — AMOXICILLIN 250 MG
1 CAPSULE ORAL DAILY
Qty: 100 TABLET | Refills: 0 | Status: SHIPPED | OUTPATIENT
Start: 2022-05-10

## 2022-05-10 RX ORDER — IBUPROFEN 600 MG/1
600 TABLET, FILM COATED ORAL EVERY 6 HOURS PRN
Qty: 60 TABLET | Refills: 0 | Status: SHIPPED | OUTPATIENT
Start: 2022-05-10

## 2022-05-10 RX ORDER — FERROUS SULFATE 325(65) MG
325 TABLET ORAL
Qty: 90 TABLET | Refills: 3 | Status: SHIPPED | OUTPATIENT
Start: 2022-05-10

## 2022-05-10 RX ADMIN — IBUPROFEN 800 MG: 800 TABLET, FILM COATED ORAL at 20:51

## 2022-05-10 RX ADMIN — IBUPROFEN 800 MG: 800 TABLET, FILM COATED ORAL at 00:11

## 2022-05-10 RX ADMIN — OXYCODONE 5 MG: 5 TABLET ORAL at 22:57

## 2022-05-10 RX ADMIN — ACETAMINOPHEN 975 MG: 325 TABLET ORAL at 00:11

## 2022-05-10 RX ADMIN — SENNOSIDES AND DOCUSATE SODIUM 1 TABLET: 50; 8.6 TABLET ORAL at 07:49

## 2022-05-10 RX ADMIN — OXYCODONE 5 MG: 5 TABLET ORAL at 14:47

## 2022-05-10 RX ADMIN — IBUPROFEN 800 MG: 800 TABLET, FILM COATED ORAL at 14:44

## 2022-05-10 RX ADMIN — IBUPROFEN 800 MG: 800 TABLET, FILM COATED ORAL at 07:49

## 2022-05-10 RX ADMIN — OXYCODONE 5 MG: 5 TABLET ORAL at 01:11

## 2022-05-10 RX ADMIN — OXYCODONE 5 MG: 5 TABLET ORAL at 10:11

## 2022-05-10 RX ADMIN — ACETAMINOPHEN 975 MG: 325 TABLET ORAL at 07:48

## 2022-05-10 RX ADMIN — ACETAMINOPHEN 975 MG: 325 TABLET ORAL at 14:46

## 2022-05-10 RX ADMIN — SENNOSIDES AND DOCUSATE SODIUM 2 TABLET: 50; 8.6 TABLET ORAL at 20:56

## 2022-05-10 RX ADMIN — ACETAMINOPHEN 975 MG: 325 TABLET ORAL at 20:51

## 2022-05-10 RX ADMIN — SIMETHICONE 80 MG: 80 TABLET, CHEWABLE ORAL at 00:30

## 2022-05-10 RX ADMIN — OXYCODONE 5 MG: 5 TABLET ORAL at 18:52

## 2022-05-10 RX ADMIN — SIMETHICONE 80 MG: 80 TABLET, CHEWABLE ORAL at 20:50

## 2022-05-10 RX ADMIN — OXYCODONE 5 MG: 5 TABLET ORAL at 05:34

## 2022-05-10 NOTE — LACTATION NOTE
This note was copied from a baby's chart.  Consult for: Second baby, breastfeeding well and formula supplements after, LGA infant with hypoglycemia initially in first hours after birth and BG 44 at 24 hours, recheck before feeding x2 after = 48-67.     History:  Repeat  delivery @ 39w1d, LGA @ 9# 5.6 ounce birthweight with 2.6% loss at 24 hours. Diaper output as expected for age, low risk serum bilirubin. Maternal history of anemia and anxiety for which she takes occasional PRN Vistaril 25 mg. QBL WNL for  at 726 mL, Hgb from 10.0 before delivery to 8.4 after.     Oral exam of baby: Smaller mouth, normal arch to palate, spontaneous tongue extension just past lip visualized; disinterested in sucking at time of exam.     Feeding assessment:  Just missed a feeding, mom says going well but some discomfort. Simulated areolar shaping at visit and left ascom number on phone to call with next feeding.     Education provided: Discussed benefits of skin to skin and feeding on cue at least every 3 hours for LGA infant, supply and demand with benefits frequent breast massage & hand expression in early days (mom states she did yesterday and knows how to do), importance of pumping with each supplement even if not getting much out the stimulation is helpful to bring milk in and talked through hands on pumping technique, benefit of hands free bra. Left ascom number for Maite to call when baby awake for feeding.     Feeding Plan: Encourage frequent skin to skin, breastfeed on cue 8 to 12 times per day, hand express after feeds in first days and hands on pumping each time baby gets formula.

## 2022-05-10 NOTE — PLAN OF CARE
Patient taking Tylenol, Ibuprofen and Oxycodone for pain control. Simethicone given per request, patient feeling some gas in abdomen but has been passing flatus. Dressing dry and intact on incision site. Fundus firm without massage at 1 cm below level of umbilicus, with scant lochia. Patient ambulating in room independently, voiding spontaneously and breastfeeding  independently every 2-3 hours with supplementation. Will continue with plan of care.

## 2022-05-10 NOTE — PROVIDER NOTIFICATION
05/10/22 1320   Provider Notification   Provider Name/Title Dr. Cruz   Method of Notification Electronic Page   Request Evaluate-Remote   Notification Reason Other   Janeth E -  from [facility] called and said patient had two doses of iron infusion  during pregnancy and was wondering if patient will get  another infusion before discharge?

## 2022-05-10 NOTE — PLAN OF CARE
Goal Outcome Evaluation:  Data: VSS and postpartum checks WNL. Patient eating and drinking normally. Patient able to empty bladder independently and up ambulating. Patient performing self care and able to care for infant.Fundus at  1 cm below U and firm  without massage.  lochia scant and  no blood clots. Dressing clean,dry and intact.   Action: Patient taking Oxycodone,Tylenol and Ibuprofen for pain and pain tolerable. Encouraged patient to  feed  and pump every 2 - 3 hours and to monitor for cues to feed infant. Patient education done( education record). Encouraged patient to take shower and remove dressing today.   Response: Patient participating in infant's care. Positive attachment with infant observed. Support/ boyfriend present at bedside and attentive to infant and patient. Patient started pumping.   Plan: Continue with the plan of cares.

## 2022-05-11 VITALS
SYSTOLIC BLOOD PRESSURE: 117 MMHG | HEART RATE: 85 BPM | OXYGEN SATURATION: 98 % | RESPIRATION RATE: 18 BRPM | TEMPERATURE: 98.5 F | DIASTOLIC BLOOD PRESSURE: 83 MMHG

## 2022-05-11 PROCEDURE — 250N000013 HC RX MED GY IP 250 OP 250 PS 637: Performed by: OBSTETRICS & GYNECOLOGY

## 2022-05-11 RX ORDER — IBUPROFEN 800 MG/1
800 TABLET, FILM COATED ORAL EVERY 8 HOURS PRN
Qty: 60 TABLET | Refills: 0 | Status: SHIPPED | OUTPATIENT
Start: 2022-05-11 | End: 2022-05-25

## 2022-05-11 RX ORDER — OXYCODONE HYDROCHLORIDE 5 MG/1
5 TABLET ORAL EVERY 4 HOURS PRN
Qty: 10 TABLET | Refills: 0 | Status: SHIPPED | OUTPATIENT
Start: 2022-05-11

## 2022-05-11 RX ADMIN — IBUPROFEN 800 MG: 800 TABLET, FILM COATED ORAL at 08:56

## 2022-05-11 RX ADMIN — SENNOSIDES AND DOCUSATE SODIUM 2 TABLET: 50; 8.6 TABLET ORAL at 07:57

## 2022-05-11 RX ADMIN — OXYCODONE 5 MG: 5 TABLET ORAL at 02:40

## 2022-05-11 RX ADMIN — OXYCODONE 5 MG: 5 TABLET ORAL at 06:42

## 2022-05-11 RX ADMIN — IBUPROFEN 800 MG: 800 TABLET, FILM COATED ORAL at 02:40

## 2022-05-11 RX ADMIN — ACETAMINOPHEN 975 MG: 325 TABLET ORAL at 08:55

## 2022-05-11 RX ADMIN — ACETAMINOPHEN 975 MG: 325 TABLET ORAL at 02:40

## 2022-05-11 NOTE — PROGRESS NOTES
Post Partum Progress Note  PPD#3    Subjective:  She is resting comfortably in bed this morning. She complains of mild abdominal pain. Pain is overall well controlled on current medication regimen. She is tolerating PO intake. Lochia present and light.  She is voiding without difficulty. She has passed flatus and has not had a BM. She is ambulating without dizziness or difficulty.  She denies headache, changes in vision, nausea/vomiting, chest pain, shortness of breath, RUQ pain, or worsening edema.  Plans to breast and bottle feed.    Objective:  Vitals:    05/10/22 0111 05/10/22 0746 05/10/22 1440 05/10/22 2245   BP: 109/74 112/74 113/74 122/86   BP Location: Right arm Right arm  Right arm   Patient Position: Semi-Dorantes's   Semi-Dorantes's   Cuff Size: Adult Regular Adult Regular  Adult Regular   Pulse: 93 94  89   Resp: 18 18 16 18   Temp: 98  F (36.7  C) 98.6  F (37  C) 98  F (36.7  C) 98.5  F (36.9  C)   TempSrc: Oral Oral Oral Oral   SpO2:         General: NAD, resting comfortably  CV: Regular rate, well perfused.   Pulm: Normal respiratory effort.  Abd: deferred as patient was breast feeding  Ext: +2 lower extremity edema bilaterally. No calf tenderness.    Assessment/Plan:  Maite Gates is a 26 year old  female who is POD#3 s/p RLTCS. Meeting postoperative goals.     - Encourage routine post-operative goals including ambulation and incentive spirometry  - PNC: Rh positive. Rubella immune. No intervention indicated.  - Pain: controlled on oral medications  - Heme: Hgb 10.0>>8.4. Will discharge home with iron.  - GI: continue anti-emetics and stool softeners as needed.  - : Voiding spontaneously.  - Infant: Stable in room  - Feeding: Plans on breast and bottle feeding.  - BC: Declines    Anxiety  - Continue vistaril prn     Discharge to home later today.    Lucero Nicole MD  Obstetrics and Gyncology, PGY-3  22. 6:07 AM.     [facility] staff:  Appreciate note by   Corey.  I have seen and examined the patient without the resident. I have reviewed, edited, and agree with the note.        Zaida Gilman MD, FACOG  5/11/2022  8:29 AM

## 2022-05-11 NOTE — PLAN OF CARE
Goal Outcome Evaluation:  VSS and postpartum assessments WDL.  Up ad kerline with steady gait and independent with cares.  Bonding well with infant.  Breastfeeding, pumping and feeding/supplementing with formula via bottle independently, infant taking up to 30mls.  Pain managed with tylenol, ibuprofen and oxycodone per MAR.  Incisional steristrips intact and interdry in place.  Significant other, Derick present and supportive.  Reviewed discharge medications.  Reviewed follow-up for appointments for 2 weeks and 6 weeks postpartum.  Reviewed discharge instructions and answered all questions.  Discharged home with infant and all belongings.

## 2022-05-11 NOTE — PLAN OF CARE
AFVSS. Postpartum assessments WDL. Patient ambulating and voiding without difficulty. Independent with self and baby cares. Taking tylenol, ibuprofen and oxycodone for pain. State pain controlled. States is passing gas. Taking simethicone as ordered. Incision free of s/s infection, open to air with interdry to pannus per patient request. Patient is breastfeeding and/or pumping breasts and giving to baby with formula supplement. Patient wanting to keep track of amounts baby is receiving as he had low BGs yesterday. Patient plans to discharge to home this morning. Continue to provide support and education as needed. Continue present cares.

## 2022-05-12 ENCOUNTER — PATIENT OUTREACH (OUTPATIENT)
Dept: CARE COORDINATION | Facility: CLINIC | Age: 27
End: 2022-05-12
Payer: COMMERCIAL

## 2022-05-12 DIAGNOSIS — Z71.89 OTHER SPECIFIED COUNSELING: ICD-10-CM

## 2022-05-12 NOTE — PROGRESS NOTES
"Clinic Care Coordination Contact  [facility]: Post-Discharge Note  SITUATION                                                      Admission:    Admission Date: 22   Reason for Admission: Pregnancy episode  Discharge:   Discharge Date: 22  Discharge Diagnosis: Pregnancy episode    BACKGROUND                                                      Per hospital discharge summary and inpatient provider notes:    Maite Gates is a 26 year old  at 39w1d by LMP who presents today to L&D following SROM at home this morning at 0600. Patient was scheduled for repeat CS tomorrow. Reports copious amounts of clear fluid shortly after waking up this morning. Denies any vaginal bleeding. Reports good fetal movement. Has otherwise been feeling well. Does report a mild headache, but this has intermittently resolved without intervention. Also has a history of anxiety which she was previously on Sertraline for, but has most recently been using Vistaril as needed. Last used twice (50mg total) yesterday. She has a history of anemia and has gotten 2 iron infusions. Has been NPO since 2200 last night.     ASSESSMENT      Enrollment  Primary Care Care Coordination Status: Not a Candidate    Discharge Assessment  How are you doing now that you are home?: \"Doing great.\"  How are your symptoms? (Red Flag symptoms escalate to triage hotline per guidelines): Improved  Do you feel your condition is stable enough to be safe at home until your provider visit?: Yes  Does the patient have their discharge instructions? : Yes  Does the patient have questions regarding their discharge instructions? : No  Were you started on any new medications or were there changes to any of your previous medications? : Yes  Does the patient have all of their medications?: Yes  Do you have questions regarding any of your medications? : No  Do you have all of your needed medical supplies or equipment (DME)?  (i.e. oxygen tank, CPAP, cane, " etc.): Yes  Discharge follow-up appointment scheduled within 14 calendar days? : No (Patient plans to call to set up her OB/GYN appts. as well as the f/u appt. for her baby.)  Is patient agreeable to assistance with scheduling? : Yes    Post-op ([facility] Only)  If the patient had a surgery or procedure, do they have any questions for a nurse?: No    PLAN                                                      Outpatient Plan:      Follow Up  Follow up with provider in 2 weeks and 6 weeks for post-delivery checks    No future appointments.      For any urgent concerns, please contact our 24 hour nurse triage line: [phone] ([phone])         Shama Espino MA

## 2022-05-24 ENCOUNTER — TELEPHONE (OUTPATIENT)
Dept: OBGYN | Facility: CLINIC | Age: 27
End: 2022-05-24
Payer: COMMERCIAL

## 2022-05-25 ENCOUNTER — OFFICE VISIT (OUTPATIENT)
Dept: OBGYN | Facility: CLINIC | Age: 27
End: 2022-05-25
Payer: COMMERCIAL

## 2022-05-25 VITALS
HEART RATE: 87 BPM | DIASTOLIC BLOOD PRESSURE: 73 MMHG | BODY MASS INDEX: 34.59 KG/M2 | SYSTOLIC BLOOD PRESSURE: 108 MMHG | WEIGHT: 195.2 LBS | HEIGHT: 63 IN

## 2022-05-25 PROCEDURE — G0463 HOSPITAL OUTPT CLINIC VISIT: HCPCS

## 2022-05-25 PROCEDURE — 99207 PR POST PARTUM EXAM: CPT | Mod: GE | Performed by: OBSTETRICS & GYNECOLOGY

## 2022-05-25 ASSESSMENT — ANXIETY QUESTIONNAIRES
7. FEELING AFRAID AS IF SOMETHING AWFUL MIGHT HAPPEN: NOT AT ALL
6. BECOMING EASILY ANNOYED OR IRRITABLE: NOT AT ALL
1. FEELING NERVOUS, ANXIOUS, OR ON EDGE: SEVERAL DAYS
3. WORRYING TOO MUCH ABOUT DIFFERENT THINGS: NOT AT ALL
2. NOT BEING ABLE TO STOP OR CONTROL WORRYING: NOT AT ALL
5. BEING SO RESTLESS THAT IT IS HARD TO SIT STILL: SEVERAL DAYS
GAD7 TOTAL SCORE: 2
GAD7 TOTAL SCORE: 2

## 2022-05-25 ASSESSMENT — PATIENT HEALTH QUESTIONNAIRE - PHQ9
5. POOR APPETITE OR OVEREATING: NOT AT ALL
SUM OF ALL RESPONSES TO PHQ QUESTIONS 1-9: 2

## 2022-05-25 NOTE — PROGRESS NOTES
"[facility]  Postpartum Visit     S:         Maite is doing well today. She is still having some soreness, but reports it is 2/10 and only occasionally needing ibuprofen and tylenol. Her vaginal bleeding is a little more than spotting, getting lighter. Voiding spontaneously, passing flatus and BM, tolerating food without nausea/vomiting, and ambulating independently. Denies drainage from her incision, fevers/chills, and other incisional concerns. She is breastfeeding well without concerns. Her pediatrician does not do circumcisions, so is asking about other clinics to bring her son for this.     O: /73   Pulse 87   Ht 1.6 m (5' 3\")   Wt 88.5 kg (195 lb 3.2 oz)   LMP 2021   BMI 34.58 kg/m    Gen:    Well-appearing, NAD  Cardio: Well-perfused  Pulm: Breathing comfortably  Abd: soft, non-tender, non-distended, incision well-approximated w/out drainage or erythema  Extrem: trace edema     A/P:  Maite Gates is a 27 yo  POD#17 s/p RLTCS. Post-op course complicated by urinary retention which spontaneously resolved. She is recovering well.      Postpartum visit  - Recovering well post-op  - Contraception: declines  - Feeding: breastfeeding, no concerns  - Continue PNV     Return to clinic in 4 weeks for 6-week postpartum visit.      Staffed with Dr. Mei.     Donna Alanis MD  OB/GYN Resident, PGY-1    The Patient was seen in Resident Continuity Clinic by DONNA ALANIS.  I reviewed the history & exam. Assessment and plan were jointly made.    Monica Mei MD      "

## 2022-06-11 ENCOUNTER — HEALTH MAINTENANCE LETTER (OUTPATIENT)
Age: 27
End: 2022-06-11

## 2022-06-21 PROBLEM — Z34.90 PREGNANCY: Status: RESOLVED | Noted: 2022-05-08 | Resolved: 2022-06-21

## 2022-06-22 ENCOUNTER — APPOINTMENT (OUTPATIENT)
Dept: LAB | Facility: CLINIC | Age: 27
End: 2022-06-22
Attending: ADVANCED PRACTICE MIDWIFE
Payer: COMMERCIAL

## 2022-06-22 ENCOUNTER — OFFICE VISIT (OUTPATIENT)
Dept: OBGYN | Facility: CLINIC | Age: 27
End: 2022-06-22
Attending: ADVANCED PRACTICE MIDWIFE
Payer: COMMERCIAL

## 2022-06-22 VITALS
DIASTOLIC BLOOD PRESSURE: 76 MMHG | HEIGHT: 63 IN | SYSTOLIC BLOOD PRESSURE: 121 MMHG | WEIGHT: 188.2 LBS | HEART RATE: 94 BPM | BODY MASS INDEX: 33.35 KG/M2

## 2022-06-22 DIAGNOSIS — O99.019 ANEMIA DURING PREGNANCY: Primary | ICD-10-CM

## 2022-06-22 LAB
ERYTHROCYTE [DISTWIDTH] IN BLOOD BY AUTOMATED COUNT: 15.2 % (ref 10–15)
HCT VFR BLD AUTO: 38.3 % (ref 35–47)
HGB BLD-MCNC: 12.4 G/DL (ref 11.7–15.7)
MCH RBC QN AUTO: 27.1 PG (ref 26.5–33)
MCHC RBC AUTO-ENTMCNC: 32.4 G/DL (ref 31.5–36.5)
MCV RBC AUTO: 84 FL (ref 78–100)
PLATELET # BLD AUTO: 302 10E3/UL (ref 150–450)
RBC # BLD AUTO: 4.58 10E6/UL (ref 3.8–5.2)
WBC # BLD AUTO: 8.2 10E3/UL (ref 4–11)

## 2022-06-22 PROCEDURE — G0463 HOSPITAL OUTPT CLINIC VISIT: HCPCS

## 2022-06-22 PROCEDURE — 36415 COLL VENOUS BLD VENIPUNCTURE: CPT | Performed by: ADVANCED PRACTICE MIDWIFE

## 2022-06-22 PROCEDURE — 85014 HEMATOCRIT: CPT | Performed by: ADVANCED PRACTICE MIDWIFE

## 2022-06-22 RX ORDER — CETIRIZINE HYDROCHLORIDE 10 MG/1
10 TABLET ORAL DAILY
COMMUNITY
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
## 2022-06-22 NOTE — PROGRESS NOTES
6-week Postpartum Visit:     Assessment:   25yo  at 6 weeks postpartum   Repeat LTCS 39/1 weeks, 22, repeat CS  Lactating mother  Contraceptive Counseling, declines contraception  SARAH 7: 0; PHQ 9: 1  Last pap = 10/7/21 unsatisfactory for evaluation  2017 NILM, Due today, declines pap  Hgb on discharge 8.4       Plan:   1. Adjustment to parenting, self care and importance of a support system discussed. Postpartum education given including: postpartum mood changes and postpartum depression. [place]  Mental Health Resource Card given for future reference prn.   2. Return of fertility discussed.   Plans withdrawal/nothing for contraception.  -Discussed that we do not recommend that Maite gets pregnant again until 9 months postpartum to adequately allow her  section scar to heal. Advised that she consider a reliable method of birth control. Maite will consider options, but declines at this time.    Resumption of intercourse reviewed with possible changes in libido and vaginal lubrication while nursing.  3. Discussed resumption of exercise and normal timing of return to pre-pregnancy weight. Postpartum physical activity reviewed and encouraged modified abdominal crunches and Kegels daily. Encouraged integrating exercise, such as walking 20-30mns daily.   4.  Nutrition and supplements reviewed.  Advised continuation of a prenatal or multivitamin, also Vitamin D3 2000 IU geltab daily and an omega 3 fatty acid supplement.  5. Reviewed warning signs of pelvic pain, excessive bleeding or abdominal pain, fever/chills, or signs of breast infection.   6. Breastfeeding support resource list and contact info given, including [facility], [facility], local LLL groups, and new moms groups. Warning signs of breast infections (mastitis and thrush) reviewed.  7. Pp Hgb 8.4, repeat Hgb today, will supplement if low today    Hgb d/t hx of postpartum  "hemorrhage    -RTC for routine health maintenance or sooner as needed.     TT with patient 40 mns, >50% time spent in counseling or coordination of care.     Subjective:   Maite Gates is a 26 year old female who presents for postpartum visit. She is 6 weeks postpartum following a repeat LTCS.  I have fully reviewed the prenatal and intrapartum course. The delivery was at Term and 39/1 weeks gestation Her baby boy is named Lennox and weighed 8 lbs 13 oz at birth. Maite had SROM the day before her scheduled repeat CS. Reflections on her birth include: \"it went so much better than the last time.\"  Baby needed  EKG /echo/holter for PVCs: After he was born the peds team decided that he did not need the imaging.   Currently baby is 11 lbs and thriving!    Hx of anxiety utilized Vistaril, has not needed it postpartum  Hx of Anemia, s/p 2 iron infusions  Has 7 year old son at home, Davon, busy with baseball, excited about having a new brother    Postpartum course has been stable. Baby's course has been stable. Baby is breastfeeding, sometimes pumps and gives breastmilk in a bottle  Lochia ceased at 4 weeks postpartum.  Bowel function is normal. Bladder function is normal. She has resumed intercourse. Desired contraception: pull out method, nothing Appetite is normal. Reports sleeping 6-8 hours total, will nap if she needs to.    Derick had 1 month off of work  Maite is able to take the summer off, will go back after the fall  Maite has a lot of support from family and friends    Declines pap collection today. She has the baby with her and would rather come without the baby for her pelvic exam    SARAH 7: 1; PHQ 9: 0    Derick is supportive.   Davon is playing a lot baseball  She has resumed regular exercise.     Review of Systems  -A 12 point comprehensive review of systems was negative except as noted above.  -Prenatal history and intrapartum course were also reviewed today.  -Social, Medical and family " "history reviewed    Objective:     Vitals:    06/22/22 1359   BP: 121/76   Pulse: 94   Weight: 85.4 kg (188 lb 3.2 oz)   Height: 1.6 m (5' 3\")       Physical Exam:  General Appearance: Alert, cooperative, no distress, appears stated age  Head: Normocephalic, without obvious abnormality, atraumatic  Eyes: Conjunctiva/corneas clear, does not wear corrective lenses  Neck: Supple, symmetrical, trachea midline, no adenopathy  Thyroid: not enlarged, symmetric, no tenderness/mass/nodules  Back: Symmetric, no curvature, ROM normal, no CVA tenderness  Lungs: Clear to auscultation bilaterally, respirations unlabored  Heart: Regular rate and rhythm, S1 and S2 normal, no murmur, rub, or gallop  Breasts: Engorgement resolved/Lactating.  Nipples intact with no cracking.  Abdomen: Soft, non-tender, no masses. Incision well healed  Diastasis  0 fb.  Pelvic: deferred   Extremities: Extremities normal, atraumatic, no cyanosis or edema  Skin: Skin color, texture, turgor normal, no rashes or lesions  Lymph nodes: Cervical, supraclavicular, and axillary nodes normal  Neurologic: Alert and oriented x 3.      Last pap = 10/7/21 unsatisfactory for evaluation  Immunization History   Administered Date(s) Administered     COVID-19,PF,Pfizer 12+ Yrs (2022 and After) 03/01/2022, 03/22/2022     DTAP (<7y) 1994, 01/26/1995, 03/14/1995, 01/09/1996, 08/11/1999     HPV Quadrivalent 11/05/2008     HPV9 09/08/2016     Hep B, Peds or Adolescent 1994, 1994, 03/14/1995     Influenza (IIV3) PF 10/29/2008     MMR 01/09/1996, 08/11/1999     Meningococcal (Menactra ) 08/29/2005     Meningococcal (Menveo ) 12/03/2013     Meningococcal,unspecified 08/29/2005     OPV, unspecified 1994, 01/26/1995, 01/09/1996     Pedvax-hib 1994, 01/26/1995, 03/14/1995, 01/09/1996     Poliovirus, inactivated (IPV) 08/11/1999     TD (ADULT, 7+) 08/29/2005     TDAP Vaccine (Adacel) 12/03/2013, 04/22/2015     Tdap (Adacel,Boostrix) 02/22/2022     " Varicella 09/29/2006, 11/05/2008     Immunization status: up to date

## 2022-06-22 NOTE — LETTER
2022       RE: Maite Gates  [id number] [place] [address]     Dear Colleague,    Thank you for referring your patient, Maite Gates, to the [facility] [place] at [facility]. Please see a copy of my visit note below.    6-week Postpartum Visit:     Assessment:   27yo  at 6 weeks postpartum   Repeat LTCS 39/1 weeks, 22, repeat CS  Lactating mother  Contraceptive Counseling, declines contraception  SARAH 7: 0; PHQ 9: 1  Last pap = 10/7/21 unsatisfactory for evaluation  2017 NILM, Due today, declines pap  Hgb on discharge 8.4       Plan:   1. Adjustment to parenting, self care and importance of a support system discussed. Postpartum education given including: postpartum mood changes and postpartum depression. [place]  Mental Health Resource Card given for future reference prn.   2. Return of fertility discussed.   Plans withdrawal/nothing for contraception.  -Discussed that we do not recommend that Maite gets pregnant again until 9 months postpartum to adequately allow her  section scar to heal. Advised that she consider a reliable method of birth control. Maite will consider options, but declines at this time.    Resumption of intercourse reviewed with possible changes in libido and vaginal lubrication while nursing.  3. Discussed resumption of exercise and normal timing of return to pre-pregnancy weight. Postpartum physical activity reviewed and encouraged modified abdominal crunches and Kegels daily. Encouraged integrating exercise, such as walking 20-30mns daily.   4.  Nutrition and supplements reviewed.  Advised continuation of a prenatal or multivitamin, also Vitamin D3 2000 IU geltab daily and an omega 3 fatty acid supplement.  5. Reviewed warning signs of pelvic pain, excessive bleeding or abdominal pain, fever/chills, or signs of breast infection.   6. Breastfeeding support resource list and  "contact info given, including [facility], [facility], local LLL groups, and new moms groups. Warning signs of breast infections (mastitis and thrush) reviewed.  7. Pp Hgb 8.4, repeat Hgb today, will supplement if low today    Hgb d/t hx of postpartum hemorrhage    -RTC for routine health maintenance or sooner as needed.     TT with patient 40 mns, >50% time spent in counseling or coordination of care.     Subjective:   Maite Gates is a 26 year old female who presents for postpartum visit. She is 6 weeks postpartum following a repeat LTCS.  I have fully reviewed the prenatal and intrapartum course. The delivery was at Term and 39/1 weeks gestation Her baby boy is named Lennox and weighed 8 lbs 13 oz at birth. Maite had SROM the day before her scheduled repeat CS. Reflections on her birth include: \"it went so much better than the last time.\"  Baby needed  EKG /echo/holter for PVCs: After he was born the peds team decided that he did not need the imaging.   Currently baby is 11 lbs and thriving!    Hx of anxiety utilized Vistaril, has not needed it postpartum  Hx of Anemia, s/p 2 iron infusions  Has 7 year old son at home, Davon, busy with baseball, excited about having a new brother    Postpartum course has been stable. Baby's course has been stable. Baby is breastfeeding, sometimes pumps and gives breastmilk in a bottle  Lochia ceased at 4 weeks postpartum.  Bowel function is normal. Bladder function is normal. She has resumed intercourse. Desired contraception: pull out method, nothing Appetite is normal. Reports sleeping 6-8 hours total, will nap if she needs to.    Derick had 1 month off of work  Maite is able to take the summer off, will go back after the fall  Maite has a lot of support from family and friends    Declines pap collection today. She has the baby with her and would rather come without the baby for her pelvic exam    SARAH 7: 1; " "PHQ 9: 0    Derick is supportive.   Davon is playing a lot baseball  She has resumed regular exercise.     Review of Systems  -A 12 point comprehensive review of systems was negative except as noted above.  -Prenatal history and intrapartum course were also reviewed today.  -Social, Medical and family history reviewed    Objective:     Vitals:    06/22/22 1359   BP: 121/76   Pulse: 94   Weight: 85.4 kg (188 lb 3.2 oz)   Height: 1.6 m (5' 3\")       Physical Exam:  General Appearance: Alert, cooperative, no distress, appears stated age  Head: Normocephalic, without obvious abnormality, atraumatic  Eyes: Conjunctiva/corneas clear, does not wear corrective lenses  Neck: Supple, symmetrical, trachea midline, no adenopathy  Thyroid: not enlarged, symmetric, no tenderness/mass/nodules  Back: Symmetric, no curvature, ROM normal, no CVA tenderness  Lungs: Clear to auscultation bilaterally, respirations unlabored  Heart: Regular rate and rhythm, S1 and S2 normal, no murmur, rub, or gallop  Breasts: Engorgement resolved/Lactating.  Nipples intact with no cracking.  Abdomen: Soft, non-tender, no masses. Incision well healed  Diastasis  0 fb.  Pelvic: deferred   Extremities: Extremities normal, atraumatic, no cyanosis or edema  Skin: Skin color, texture, turgor normal, no rashes or lesions  Lymph nodes: Cervical, supraclavicular, and axillary nodes normal  Neurologic: Alert and oriented x 3.      Last pap = 10/7/21 unsatisfactory for evaluation  Immunization History   Administered Date(s) Administered     COVID-19,PF,Pfizer 12+ Yrs (2022 and After) 03/01/2022, 03/22/2022     DTAP (<7y) 1994, 01/26/1995, 03/14/1995, 01/09/1996, 08/11/1999     HPV Quadrivalent 11/05/2008     HPV9 09/08/2016     Hep B, Peds or Adolescent 1994, 1994, 03/14/1995     Influenza (IIV3) PF 10/29/2008     MMR 01/09/1996, 08/11/1999     Meningococcal (Menactra ) 08/29/2005     Meningococcal (Menveo ) 12/03/2013     " Meningococcal,unspecified 08/29/2005     OPV, unspecified 1994, 01/26/1995, 01/09/1996     Pedvax-hib 1994, 01/26/1995, 03/14/1995, 01/09/1996     Poliovirus, inactivated (IPV) 08/11/1999     TD (ADULT, 7+) 08/29/2005     TDAP Vaccine (Adacel) 12/03/2013, 04/22/2015     Tdap (Adacel,Boostrix) 02/22/2022     Varicella 09/29/2006, 11/05/2008     Immunization status: up to date                   Again, thank you for allowing me to participate in the care of your patient.      Sincerely,    Madelaine Bautista CNM

## 2022-06-22 NOTE — LETTER
Date:June 23, 2022      Provider requested that no letter be sent. Do not send.       [facility]

## 2022-10-22 ENCOUNTER — HEALTH MAINTENANCE LETTER (OUTPATIENT)
Age: 27
End: 2022-10-22

## 2022-10-26 ENCOUNTER — LAB REQUISITION (OUTPATIENT)
Dept: LAB | Facility: CLINIC | Age: 27
End: 2022-10-26
Payer: COMMERCIAL

## 2022-10-26 DIAGNOSIS — N92.6 IRREGULAR MENSTRUATION, UNSPECIFIED: ICD-10-CM

## 2022-10-26 DIAGNOSIS — Z12.4 ENCOUNTER FOR SCREENING FOR MALIGNANT NEOPLASM OF CERVIX: ICD-10-CM

## 2022-10-26 LAB
ERYTHROCYTE [DISTWIDTH] IN BLOOD BY AUTOMATED COUNT: 14.1 % (ref 10–15)
HCT VFR BLD AUTO: 40.1 % (ref 35–47)
HGB BLD-MCNC: 13 G/DL (ref 11.7–15.7)
MCH RBC QN AUTO: 28.8 PG (ref 26.5–33)
MCHC RBC AUTO-ENTMCNC: 32.4 G/DL (ref 31.5–36.5)
MCV RBC AUTO: 89 FL (ref 78–100)
PLATELET # BLD AUTO: 279 10E3/UL (ref 150–450)
RBC # BLD AUTO: 4.51 10E6/UL (ref 3.8–5.2)
TSH SERPL DL<=0.005 MIU/L-ACNC: 1.8 UIU/ML (ref 0.3–4.2)
WBC # BLD AUTO: 9.8 10E3/UL (ref 4–11)

## 2022-10-26 PROCEDURE — 85027 COMPLETE CBC AUTOMATED: CPT | Performed by: NURSE PRACTITIONER

## 2022-10-26 PROCEDURE — G0145 SCR C/V CYTO,THINLAYER,RESCR: HCPCS | Performed by: NURSE PRACTITIONER

## 2022-10-26 PROCEDURE — 84443 ASSAY THYROID STIM HORMONE: CPT | Performed by: NURSE PRACTITIONER

## 2022-10-28 LAB
BKR LAB AP GYN ADEQUACY: NORMAL
BKR LAB AP GYN INTERPRETATION: NORMAL
BKR LAB AP HPV REFLEX: NORMAL
BKR LAB AP LMP: NORMAL
BKR LAB AP PREVIOUS ABNL DX: NORMAL
BKR LAB AP PREVIOUS ABNORMAL: NORMAL
PATH REPORT.COMMENTS IMP SPEC: NORMAL
PATH REPORT.COMMENTS IMP SPEC: NORMAL
PATH REPORT.RELEVANT HX SPEC: NORMAL

## 2023-03-02 ENCOUNTER — LAB REQUISITION (OUTPATIENT)
Dept: LAB | Facility: CLINIC | Age: 28
End: 2023-03-02
Payer: COMMERCIAL

## 2023-03-02 DIAGNOSIS — Z36.89 ENCOUNTER FOR OTHER SPECIFIED ANTENATAL SCREENING: ICD-10-CM

## 2023-03-02 LAB
BASOPHILS # BLD AUTO: 0.1 10E3/UL (ref 0–0.2)
BASOPHILS NFR BLD AUTO: 1 %
EOSINOPHIL # BLD AUTO: 0.1 10E3/UL (ref 0–0.7)
EOSINOPHIL NFR BLD AUTO: 1 %
ERYTHROCYTE [DISTWIDTH] IN BLOOD BY AUTOMATED COUNT: 13.8 % (ref 10–15)
HCT VFR BLD AUTO: 40.7 % (ref 35–47)
HGB BLD-MCNC: 12.9 G/DL (ref 11.7–15.7)
IMM GRANULOCYTES # BLD: 0 10E3/UL
IMM GRANULOCYTES NFR BLD: 0 %
LYMPHOCYTES # BLD AUTO: 1.8 10E3/UL (ref 0.8–5.3)
LYMPHOCYTES NFR BLD AUTO: 18 %
MCH RBC QN AUTO: 28.9 PG (ref 26.5–33)
MCHC RBC AUTO-ENTMCNC: 31.7 G/DL (ref 31.5–36.5)
MCV RBC AUTO: 91 FL (ref 78–100)
MONOCYTES # BLD AUTO: 0.6 10E3/UL (ref 0–1.3)
MONOCYTES NFR BLD AUTO: 7 %
NEUTROPHILS # BLD AUTO: 7.2 10E3/UL (ref 1.6–8.3)
NEUTROPHILS NFR BLD AUTO: 73 %
NRBC # BLD AUTO: 0 10E3/UL
NRBC BLD AUTO-RTO: 0 /100
PLATELET # BLD AUTO: 270 10E3/UL (ref 150–450)
RBC # BLD AUTO: 4.47 10E6/UL (ref 3.8–5.2)
WBC # BLD AUTO: 9.7 10E3/UL (ref 4–11)

## 2023-03-02 PROCEDURE — 80081 OBSTETRIC PANEL INC HIV TSTG: CPT | Performed by: NURSE PRACTITIONER

## 2023-03-02 PROCEDURE — 87340 HEPATITIS B SURFACE AG IA: CPT | Performed by: NURSE PRACTITIONER

## 2023-03-02 PROCEDURE — 86850 RBC ANTIBODY SCREEN: CPT | Performed by: NURSE PRACTITIONER

## 2023-03-02 PROCEDURE — 86901 BLOOD TYPING SEROLOGIC RH(D): CPT | Performed by: NURSE PRACTITIONER

## 2023-03-02 PROCEDURE — 87491 CHLMYD TRACH DNA AMP PROBE: CPT | Performed by: NURSE PRACTITIONER

## 2023-03-02 PROCEDURE — 87389 HIV-1 AG W/HIV-1&-2 AB AG IA: CPT | Performed by: NURSE PRACTITIONER

## 2023-03-02 PROCEDURE — 86803 HEPATITIS C AB TEST: CPT | Performed by: NURSE PRACTITIONER

## 2023-03-02 PROCEDURE — 83036 HEMOGLOBIN GLYCOSYLATED A1C: CPT | Performed by: NURSE PRACTITIONER

## 2023-03-02 PROCEDURE — 86592 SYPHILIS TEST NON-TREP QUAL: CPT | Performed by: NURSE PRACTITIONER

## 2023-03-02 PROCEDURE — 87086 URINE CULTURE/COLONY COUNT: CPT | Performed by: NURSE PRACTITIONER

## 2023-03-02 PROCEDURE — 86762 RUBELLA ANTIBODY: CPT | Performed by: NURSE PRACTITIONER

## 2023-03-03 LAB
ABO/RH(D): NORMAL
ANTIBODY SCREEN: NEGATIVE
C TRACH DNA SPEC QL PROBE+SIG AMP: NEGATIVE
HBA1C MFR BLD: 5.5 %
N GONORRHOEA DNA SPEC QL NAA+PROBE: NEGATIVE
RPR SER QL: NONREACTIVE
RUBV IGG SERPL QL IA: 1.47 INDEX
RUBV IGG SERPL QL IA: POSITIVE
SPECIMEN EXPIRATION DATE: NORMAL

## 2023-03-04 LAB
BACTERIA UR CULT: NORMAL
HBV SURFACE AG SERPL QL IA: NONREACTIVE
HCV AB SERPL QL IA: NONREACTIVE
HIV 1+2 AB+HIV1 P24 AG SERPL QL IA: NONREACTIVE

## 2023-06-18 ENCOUNTER — HEALTH MAINTENANCE LETTER (OUTPATIENT)
Age: 28
End: 2023-06-18

## 2023-08-22 ENCOUNTER — LAB REQUISITION (OUTPATIENT)
Dept: LAB | Facility: CLINIC | Age: 28
End: 2023-08-22
Payer: COMMERCIAL

## 2023-08-22 DIAGNOSIS — O99.019 ANEMIA COMPLICATING PREGNANCY, UNSPECIFIED TRIMESTER: ICD-10-CM

## 2023-08-22 LAB
BASOPHILS # BLD AUTO: 0.1 10E3/UL (ref 0–0.2)
BASOPHILS NFR BLD AUTO: 1 %
EOSINOPHIL # BLD AUTO: 0.1 10E3/UL (ref 0–0.7)
EOSINOPHIL NFR BLD AUTO: 1 %
ERYTHROCYTE [DISTWIDTH] IN BLOOD BY AUTOMATED COUNT: 14.6 % (ref 10–15)
HCT VFR BLD AUTO: 33.4 % (ref 35–47)
HGB BLD-MCNC: 10.6 G/DL (ref 11.7–15.7)
IMM GRANULOCYTES # BLD: 0.2 10E3/UL
IMM GRANULOCYTES NFR BLD: 1 %
LYMPHOCYTES # BLD AUTO: 1.8 10E3/UL (ref 0.8–5.3)
LYMPHOCYTES NFR BLD AUTO: 14 %
MCH RBC QN AUTO: 29 PG (ref 26.5–33)
MCHC RBC AUTO-ENTMCNC: 31.7 G/DL (ref 31.5–36.5)
MCV RBC AUTO: 92 FL (ref 78–100)
MONOCYTES # BLD AUTO: 0.6 10E3/UL (ref 0–1.3)
MONOCYTES NFR BLD AUTO: 5 %
NEUTROPHILS # BLD AUTO: 10.5 10E3/UL (ref 1.6–8.3)
NEUTROPHILS NFR BLD AUTO: 78 %
NRBC # BLD AUTO: 0 10E3/UL
NRBC BLD AUTO-RTO: 0 /100
PLATELET # BLD AUTO: 180 10E3/UL (ref 150–450)
RBC # BLD AUTO: 3.65 10E6/UL (ref 3.8–5.2)
WBC # BLD AUTO: 13.3 10E3/UL (ref 4–11)

## 2023-08-22 PROCEDURE — 85025 COMPLETE CBC W/AUTO DIFF WBC: CPT | Performed by: NURSE PRACTITIONER

## 2023-09-07 ENCOUNTER — LAB REQUISITION (OUTPATIENT)
Dept: LAB | Facility: CLINIC | Age: 28
End: 2023-09-07
Payer: COMMERCIAL

## 2023-09-07 DIAGNOSIS — O99.019 ANEMIA COMPLICATING PREGNANCY, UNSPECIFIED TRIMESTER: ICD-10-CM

## 2023-09-07 DIAGNOSIS — Z36.89 ENCOUNTER FOR OTHER SPECIFIED ANTENATAL SCREENING: ICD-10-CM

## 2023-09-07 LAB — FERRITIN SERPL-MCNC: 14 NG/ML (ref 6–175)

## 2023-09-07 PROCEDURE — 82728 ASSAY OF FERRITIN: CPT | Performed by: NURSE PRACTITIONER

## 2023-09-07 PROCEDURE — 87653 STREP B DNA AMP PROBE: CPT | Mod: ORL | Performed by: NURSE PRACTITIONER

## 2023-09-08 LAB — GP B STREP DNA SPEC QL NAA+PROBE: NEGATIVE

## 2024-04-26 ENCOUNTER — LAB REQUISITION (OUTPATIENT)
Dept: LAB | Facility: CLINIC | Age: 29
End: 2024-04-26

## 2024-04-26 DIAGNOSIS — Z36.89 ENCOUNTER FOR OTHER SPECIFIED ANTENATAL SCREENING: ICD-10-CM

## 2024-04-26 LAB
ABO/RH(D): NORMAL
ANTIBODY SCREEN: NEGATIVE
BASOPHILS # BLD AUTO: 0.1 10E3/UL (ref 0–0.2)
BASOPHILS NFR BLD AUTO: 1 %
EOSINOPHIL # BLD AUTO: 0.1 10E3/UL (ref 0–0.7)
EOSINOPHIL NFR BLD AUTO: 1 %
ERYTHROCYTE [DISTWIDTH] IN BLOOD BY AUTOMATED COUNT: 14.4 % (ref 10–15)
HBA1C MFR BLD: 5.3 %
HCT VFR BLD AUTO: 37.8 % (ref 35–47)
HGB BLD-MCNC: 12.7 G/DL (ref 11.7–15.7)
HIV 1+2 AB+HIV1 P24 AG SERPL QL IA: NONREACTIVE
HOLD SPECIMEN: NORMAL
IMM GRANULOCYTES # BLD: 0 10E3/UL
IMM GRANULOCYTES NFR BLD: 0 %
LYMPHOCYTES # BLD AUTO: 2.2 10E3/UL (ref 0.8–5.3)
LYMPHOCYTES NFR BLD AUTO: 21 %
MCH RBC QN AUTO: 28.7 PG (ref 26.5–33)
MCHC RBC AUTO-ENTMCNC: 33.6 G/DL (ref 31.5–36.5)
MCV RBC AUTO: 86 FL (ref 78–100)
MONOCYTES # BLD AUTO: 0.7 10E3/UL (ref 0–1.3)
MONOCYTES NFR BLD AUTO: 7 %
NEUTROPHILS # BLD AUTO: 7.6 10E3/UL (ref 1.6–8.3)
NEUTROPHILS NFR BLD AUTO: 70 %
NRBC # BLD AUTO: 0 10E3/UL
NRBC BLD AUTO-RTO: 0 /100
PLATELET # BLD AUTO: 234 10E3/UL (ref 150–450)
RBC # BLD AUTO: 4.42 10E6/UL (ref 3.8–5.2)
RUBV IGG SERPL QL IA: 1.23 INDEX
RUBV IGG SERPL QL IA: POSITIVE
SPECIMEN EXPIRATION DATE: NORMAL
WBC # BLD AUTO: 10.8 10E3/UL (ref 4–11)

## 2024-04-26 PROCEDURE — 83036 HEMOGLOBIN GLYCOSYLATED A1C: CPT | Performed by: NURSE PRACTITIONER

## 2024-04-26 PROCEDURE — 87389 HIV-1 AG W/HIV-1&-2 AB AG IA: CPT | Performed by: NURSE PRACTITIONER

## 2024-04-26 PROCEDURE — 86900 BLOOD TYPING SEROLOGIC ABO: CPT | Performed by: NURSE PRACTITIONER

## 2024-04-26 PROCEDURE — 87086 URINE CULTURE/COLONY COUNT: CPT | Performed by: NURSE PRACTITIONER

## 2024-04-26 PROCEDURE — 87340 HEPATITIS B SURFACE AG IA: CPT | Performed by: NURSE PRACTITIONER

## 2024-04-26 PROCEDURE — 86803 HEPATITIS C AB TEST: CPT | Performed by: NURSE PRACTITIONER

## 2024-04-26 PROCEDURE — 86762 RUBELLA ANTIBODY: CPT | Performed by: NURSE PRACTITIONER

## 2024-04-26 PROCEDURE — 85025 COMPLETE CBC W/AUTO DIFF WBC: CPT | Performed by: NURSE PRACTITIONER

## 2024-04-27 LAB
HBV SURFACE AG SERPL QL IA: NONREACTIVE
HCV AB SERPL QL IA: NONREACTIVE

## 2024-04-28 LAB — BACTERIA UR CULT: NO GROWTH

## 2024-04-29 LAB — RPR SER QL: NONREACTIVE

## 2024-08-11 ENCOUNTER — HEALTH MAINTENANCE LETTER (OUTPATIENT)
Age: 29
End: 2024-08-11

## 2024-10-25 ENCOUNTER — LAB REQUISITION (OUTPATIENT)
Dept: LAB | Facility: CLINIC | Age: 29
End: 2024-10-25

## 2024-10-25 DIAGNOSIS — O40.1XX0: ICD-10-CM

## 2024-10-25 LAB
EST. AVERAGE GLUCOSE BLD GHB EST-MCNC: 120 MG/DL
HBA1C MFR BLD: 5.8 %

## 2024-10-25 PROCEDURE — 83036 HEMOGLOBIN GLYCOSYLATED A1C: CPT | Performed by: NURSE PRACTITIONER

## 2024-11-01 ENCOUNTER — LAB REQUISITION (OUTPATIENT)
Dept: LAB | Facility: CLINIC | Age: 29
End: 2024-11-01

## 2024-11-01 ENCOUNTER — HOSPITAL ENCOUNTER (OUTPATIENT)
Facility: CLINIC | Age: 29
Discharge: HOME | End: 2024-11-01
Admitting: NURSE PRACTITIONER
Payer: COMMERCIAL

## 2024-11-01 DIAGNOSIS — Z36.89 ENCOUNTER FOR OTHER SPECIFIED ANTENATAL SCREENING: ICD-10-CM

## 2024-11-01 DIAGNOSIS — O99.019 ANEMIA COMPLICATING PREGNANCY, UNSPECIFIED TRIMESTER: ICD-10-CM

## 2024-11-01 LAB
ERYTHROCYTE [DISTWIDTH] IN BLOOD BY AUTOMATED COUNT: 14.9 % (ref 10–15)
HCT VFR BLD AUTO: 31.8 % (ref 35–47)
HGB BLD-MCNC: 9.5 G/DL (ref 11.7–15.7)
MCH RBC QN AUTO: 24.3 PG (ref 26.5–33)
MCHC RBC AUTO-ENTMCNC: 29.9 G/DL (ref 31.5–36.5)
MCV RBC AUTO: 81 FL (ref 78–100)
PLATELET # BLD AUTO: 236 10E3/UL (ref 150–450)
RBC # BLD AUTO: 3.91 10E6/UL (ref 3.8–5.2)
WBC # BLD AUTO: 10 10E3/UL (ref 4–11)

## 2024-11-01 PROCEDURE — 82728 ASSAY OF FERRITIN: CPT | Performed by: NURSE PRACTITIONER

## 2024-11-01 PROCEDURE — 87653 STREP B DNA AMP PROBE: CPT | Performed by: NURSE PRACTITIONER

## 2024-11-01 PROCEDURE — 85014 HEMATOCRIT: CPT | Performed by: NURSE PRACTITIONER

## 2024-11-02 LAB
FERRITIN SERPL-MCNC: 11 NG/ML (ref 6–175)
GP B STREP DNA SPEC QL NAA+PROBE: NEGATIVE

## 2025-06-06 ENCOUNTER — LAB REQUISITION (OUTPATIENT)
Dept: LAB | Facility: CLINIC | Age: 30
End: 2025-06-06

## 2025-06-06 DIAGNOSIS — N92.6 IRREGULAR MENSTRUATION, UNSPECIFIED: ICD-10-CM

## 2025-06-06 PROCEDURE — 84443 ASSAY THYROID STIM HORMONE: CPT | Performed by: NURSE PRACTITIONER

## 2025-06-06 PROCEDURE — 84702 CHORIONIC GONADOTROPIN TEST: CPT | Performed by: NURSE PRACTITIONER

## 2025-06-07 LAB
HCG INTACT+B SERPL-ACNC: <1 MIU/ML
TSH SERPL DL<=0.005 MIU/L-ACNC: 1.11 UIU/ML (ref 0.3–4.2)

## 2025-08-17 ENCOUNTER — HEALTH MAINTENANCE LETTER (OUTPATIENT)
Age: 30
End: 2025-08-17

## (undated) DEVICE — STRAP KNEE/BODY 31143004

## (undated) DEVICE — SU MONOCRYL 4-0 PS-2 18" UND Y496G

## (undated) DEVICE — STOCKING SLEEVE COMPRESSION CALF LG

## (undated) DEVICE — SU VICRYL 0 CT-1 36" J346H

## (undated) DEVICE — SU PLAIN 3-0 CTX 27" 873H

## (undated) DEVICE — SOL NACL 0.9% IRRIG 1000ML BOTTLE 07138-09

## (undated) DEVICE — PACK C-SECTION LF PL15OTA83B

## (undated) DEVICE — SOL WATER IRRIG 1000ML BOTTLE 07139-09

## (undated) DEVICE — GLOVE ESTEEM POWDER FREE SMT 6.5  2D72PT65

## (undated) DEVICE — CATH TRAY FOLEY SURESTEP 16FR WDRAIN BAG STLK LATEX A300316A

## (undated) DEVICE — PREP CHLORAPREP 26ML TINTED ORANGE  260815

## (undated) DEVICE — GLOVE PROTEXIS BLUE W/NEU-THERA 7.0  2D73EB70

## (undated) RX ORDER — OXYTOCIN/0.9 % SODIUM CHLORIDE 30/500 ML
PLASTIC BAG, INJECTION (ML) INTRAVENOUS
Status: DISPENSED
Start: 2022-05-08

## (undated) RX ORDER — MORPHINE SULFATE 1 MG/ML
INJECTION, SOLUTION EPIDURAL; INTRATHECAL; INTRAVENOUS
Status: DISPENSED
Start: 2022-05-08

## (undated) RX ORDER — ONDANSETRON 2 MG/ML
INJECTION INTRAMUSCULAR; INTRAVENOUS
Status: DISPENSED
Start: 2022-05-08

## (undated) RX ORDER — FENTANYL CITRATE 50 UG/ML
INJECTION, SOLUTION INTRAMUSCULAR; INTRAVENOUS
Status: DISPENSED
Start: 2022-05-08

## (undated) RX ORDER — KETOROLAC TROMETHAMINE 30 MG/ML
INJECTION, SOLUTION INTRAMUSCULAR; INTRAVENOUS
Status: DISPENSED
Start: 2022-05-08

## (undated) RX ORDER — BUPIVACAINE HYDROCHLORIDE 2.5 MG/ML
INJECTION, SOLUTION EPIDURAL; INFILTRATION; INTRACAUDAL
Status: DISPENSED
Start: 2022-05-08